# Patient Record
Sex: FEMALE | Race: WHITE | NOT HISPANIC OR LATINO | Employment: UNEMPLOYED | URBAN - NONMETROPOLITAN AREA
[De-identification: names, ages, dates, MRNs, and addresses within clinical notes are randomized per-mention and may not be internally consistent; named-entity substitution may affect disease eponyms.]

---

## 2023-03-09 DIAGNOSIS — R60.9 EDEMA, UNSPECIFIED: ICD-10-CM

## 2023-03-09 RX ORDER — FUROSEMIDE 40 MG/1
TABLET ORAL
Qty: 90 TABLET | Refills: 1 | Status: SHIPPED | OUTPATIENT
Start: 2023-03-09 | End: 2024-04-02 | Stop reason: WASHOUT

## 2023-03-23 DIAGNOSIS — F32.A DEPRESSION, UNSPECIFIED: ICD-10-CM

## 2023-03-23 RX ORDER — DULOXETIN HYDROCHLORIDE 60 MG/1
CAPSULE, DELAYED RELEASE ORAL
Qty: 90 CAPSULE | Refills: 1 | Status: SHIPPED | OUTPATIENT
Start: 2023-03-23 | End: 2024-01-25

## 2023-03-23 RX ORDER — DULOXETIN HYDROCHLORIDE 30 MG/1
CAPSULE, DELAYED RELEASE ORAL
Qty: 90 CAPSULE | Refills: 1 | Status: SHIPPED | OUTPATIENT
Start: 2023-03-23 | End: 2023-07-13

## 2023-03-30 DIAGNOSIS — G25.81 RESTLESS LEGS SYNDROME: ICD-10-CM

## 2023-03-30 PROBLEM — E11.8 DIABETES MELLITUS WITH COMPLICATION (MULTI): Status: ACTIVE | Noted: 2023-03-30

## 2023-03-30 PROBLEM — E78.5 HYPERLIPIDEMIA: Status: ACTIVE | Noted: 2023-03-30

## 2023-03-30 PROBLEM — G47.33 OBSTRUCTIVE SLEEP APNEA: Status: ACTIVE | Noted: 2023-03-30

## 2023-03-30 PROBLEM — R76.8 POSITIVE ANA (ANTINUCLEAR ANTIBODY): Status: RESOLVED | Noted: 2023-03-30 | Resolved: 2023-03-30

## 2023-03-30 PROBLEM — G43.909 MIGRAINE HEADACHE: Status: ACTIVE | Noted: 2023-03-30

## 2023-03-30 PROBLEM — I25.10 CAD (CORONARY ARTERY DISEASE): Status: RESOLVED | Noted: 2023-03-30 | Resolved: 2023-03-30

## 2023-03-30 PROBLEM — M75.101 ROTATOR CUFF SYNDROME OF RIGHT SHOULDER: Status: RESOLVED | Noted: 2023-03-30 | Resolved: 2023-03-30

## 2023-03-30 PROBLEM — F51.04 CHRONIC INSOMNIA: Status: ACTIVE | Noted: 2023-03-30

## 2023-03-30 PROBLEM — F32.A ANXIETY AND DEPRESSION: Status: ACTIVE | Noted: 2023-03-30

## 2023-03-30 PROBLEM — F32.2 SEVERE MAJOR DEPRESSION (MULTI): Status: ACTIVE | Noted: 2023-03-30

## 2023-03-30 PROBLEM — E03.9 HYPOTHYROIDISM: Status: ACTIVE | Noted: 2023-03-30

## 2023-03-30 PROBLEM — F41.9 ANXIETY AND DEPRESSION: Status: ACTIVE | Noted: 2023-03-30

## 2023-03-30 RX ORDER — GEMFIBROZIL 600 MG/1
1 TABLET, FILM COATED ORAL 2 TIMES DAILY
COMMUNITY
Start: 2012-12-07 | End: 2023-12-21

## 2023-03-30 RX ORDER — PROPRANOLOL HYDROCHLORIDE 60 MG/1
1 CAPSULE, EXTENDED RELEASE ORAL DAILY
COMMUNITY
Start: 2021-06-28 | End: 2023-04-27 | Stop reason: DRUGHIGH

## 2023-03-30 RX ORDER — DULAGLUTIDE 0.75 MG/.5ML
INJECTION, SOLUTION SUBCUTANEOUS
COMMUNITY
End: 2023-07-13

## 2023-03-30 RX ORDER — TRAZODONE HYDROCHLORIDE 50 MG/1
TABLET ORAL NIGHTLY PRN
COMMUNITY
End: 2024-05-17

## 2023-03-30 RX ORDER — METFORMIN HYDROCHLORIDE 1000 MG/1
1000 TABLET ORAL
COMMUNITY
End: 2023-05-24

## 2023-03-30 RX ORDER — SUMATRIPTAN 50 MG/1
TABLET, FILM COATED ORAL
COMMUNITY
Start: 2021-05-20

## 2023-03-30 RX ORDER — INSULIN GLARGINE 100 [IU]/ML
INJECTION, SOLUTION SUBCUTANEOUS
COMMUNITY
End: 2023-04-27 | Stop reason: SDUPTHER

## 2023-03-30 RX ORDER — PEN NEEDLE, DIABETIC 32GX 5/32"
NEEDLE, DISPOSABLE MISCELLANEOUS
COMMUNITY
Start: 2022-11-30 | End: 2023-11-28

## 2023-03-30 RX ORDER — BLOOD-GLUCOSE METER
KIT MISCELLANEOUS 4 TIMES DAILY
COMMUNITY
Start: 2022-05-26

## 2023-03-30 RX ORDER — GLIMEPIRIDE 4 MG/1
1 TABLET ORAL
COMMUNITY
Start: 2017-04-18

## 2023-03-30 RX ORDER — BUPROPION HYDROCHLORIDE 75 MG/1
75 TABLET ORAL NIGHTLY
COMMUNITY
End: 2024-04-02 | Stop reason: WASHOUT

## 2023-03-30 RX ORDER — LEVOTHYROXINE SODIUM 150 UG/1
150 TABLET ORAL DAILY
COMMUNITY
End: 2023-07-28 | Stop reason: SDUPTHER

## 2023-03-30 RX ORDER — CLONAZEPAM 0.5 MG/1
TABLET ORAL
COMMUNITY
Start: 2023-01-26 | End: 2023-10-31

## 2023-03-30 RX ORDER — GABAPENTIN ENACARBIL 600 MG/1
TABLET, EXTENDED RELEASE ORAL
COMMUNITY
End: 2023-03-30 | Stop reason: SDUPTHER

## 2023-03-30 RX ORDER — ATORVASTATIN CALCIUM 80 MG/1
80 TABLET, FILM COATED ORAL DAILY
COMMUNITY
End: 2024-01-14

## 2023-03-30 RX ORDER — ROPINIROLE 2 MG/1
2 TABLET, FILM COATED ORAL NIGHTLY
COMMUNITY
Start: 2023-03-03 | End: 2024-03-04

## 2023-03-31 RX ORDER — GABAPENTIN ENACARBIL 600 MG/1
TABLET, EXTENDED RELEASE ORAL
Qty: 30 TABLET | Refills: 5 | OUTPATIENT
Start: 2023-03-31

## 2023-04-27 ENCOUNTER — OFFICE VISIT (OUTPATIENT)
Dept: PRIMARY CARE | Facility: CLINIC | Age: 63
End: 2023-04-27
Payer: COMMERCIAL

## 2023-04-27 ENCOUNTER — LAB (OUTPATIENT)
Dept: LAB | Facility: LAB | Age: 63
End: 2023-04-27
Payer: COMMERCIAL

## 2023-04-27 VITALS
DIASTOLIC BLOOD PRESSURE: 82 MMHG | OXYGEN SATURATION: 96 % | SYSTOLIC BLOOD PRESSURE: 125 MMHG | HEART RATE: 101 BPM | WEIGHT: 208 LBS | TEMPERATURE: 97.5 F | HEIGHT: 66 IN | BODY MASS INDEX: 33.43 KG/M2

## 2023-04-27 DIAGNOSIS — E11.8 DIABETES MELLITUS WITH COMPLICATION (MULTI): ICD-10-CM

## 2023-04-27 DIAGNOSIS — F32.2 SEVERE MAJOR DEPRESSION (MULTI): ICD-10-CM

## 2023-04-27 DIAGNOSIS — Z12.11 SCREENING FOR COLON CANCER: ICD-10-CM

## 2023-04-27 DIAGNOSIS — R39.89 ABNORMAL URINE COLOR: ICD-10-CM

## 2023-04-27 DIAGNOSIS — R42 DIZZINESS: ICD-10-CM

## 2023-04-27 DIAGNOSIS — R00.2 PALPITATIONS: ICD-10-CM

## 2023-04-27 DIAGNOSIS — Z12.31 ENCOUNTER FOR SCREENING MAMMOGRAM FOR BREAST CANCER: ICD-10-CM

## 2023-04-27 DIAGNOSIS — E11.8 DIABETES MELLITUS WITH COMPLICATION (MULTI): Primary | ICD-10-CM

## 2023-04-27 LAB
ALANINE AMINOTRANSFERASE (SGPT) (U/L) IN SER/PLAS: 28 U/L (ref 7–45)
ALBUMIN (G/DL) IN SER/PLAS: 4.7 G/DL (ref 3.4–5)
ALKALINE PHOSPHATASE (U/L) IN SER/PLAS: 113 U/L (ref 33–136)
ANION GAP IN SER/PLAS: 14 MMOL/L (ref 10–20)
APPEARANCE, URINE: ABNORMAL
ASPARTATE AMINOTRANSFERASE (SGOT) (U/L) IN SER/PLAS: 23 U/L (ref 9–39)
BILIRUBIN TOTAL (MG/DL) IN SER/PLAS: 0.5 MG/DL (ref 0–1.2)
BILIRUBIN, URINE: NEGATIVE
BLOOD, URINE: NEGATIVE
CALCIUM (MG/DL) IN SER/PLAS: 10.2 MG/DL (ref 8.6–10.3)
CARBON DIOXIDE, TOTAL (MMOL/L) IN SER/PLAS: 31 MMOL/L (ref 21–32)
CHLORIDE (MMOL/L) IN SER/PLAS: 99 MMOL/L (ref 98–107)
CHOLESTEROL (MG/DL) IN SER/PLAS: 230 MG/DL (ref 0–199)
CHOLESTEROL IN HDL (MG/DL) IN SER/PLAS: 39.8 MG/DL
CHOLESTEROL/HDL RATIO: 5.8
COLOR, URINE: YELLOW
CREATININE (MG/DL) IN SER/PLAS: 1.08 MG/DL (ref 0.5–1.05)
ERYTHROCYTE DISTRIBUTION WIDTH (RATIO) BY AUTOMATED COUNT: 13 % (ref 11.5–14.5)
ERYTHROCYTE MEAN CORPUSCULAR HEMOGLOBIN CONCENTRATION (G/DL) BY AUTOMATED: 31.9 G/DL (ref 32–36)
ERYTHROCYTE MEAN CORPUSCULAR VOLUME (FL) BY AUTOMATED COUNT: 89 FL (ref 80–100)
ERYTHROCYTES (10*6/UL) IN BLOOD BY AUTOMATED COUNT: 5.5 X10E12/L (ref 4–5.2)
GFR FEMALE: 58 ML/MIN/1.73M2
GLUCOSE (MG/DL) IN SER/PLAS: 144 MG/DL (ref 74–99)
GLUCOSE, URINE: NEGATIVE MG/DL
HEMATOCRIT (%) IN BLOOD BY AUTOMATED COUNT: 49.2 % (ref 36–46)
HEMOGLOBIN (G/DL) IN BLOOD: 15.7 G/DL (ref 12–16)
HYALINE CASTS, URINE: ABNORMAL /LPF
KETONES, URINE: NEGATIVE MG/DL
LDL: 150 MG/DL (ref 0–99)
LEUKOCYTE ESTERASE, URINE: ABNORMAL
LEUKOCYTES (10*3/UL) IN BLOOD BY AUTOMATED COUNT: 7.5 X10E9/L (ref 4.4–11.3)
MUCUS, URINE: ABNORMAL /LPF
NITRITE, URINE: NEGATIVE
NON HDL CHOLESTEROL: 190 MG/DL
PH, URINE: 5 (ref 5–8)
PLATELETS (10*3/UL) IN BLOOD AUTOMATED COUNT: 445 X10E9/L (ref 150–450)
POC HEMOGLOBIN A1C: 8.7 % (ref 4.2–6.5)
POTASSIUM (MMOL/L) IN SER/PLAS: 4.2 MMOL/L (ref 3.5–5.3)
PROTEIN TOTAL: 8.2 G/DL (ref 6.4–8.2)
PROTEIN, URINE: ABNORMAL MG/DL
RBC, URINE: ABNORMAL /HPF (ref 0–5)
SODIUM (MMOL/L) IN SER/PLAS: 140 MMOL/L (ref 136–145)
SPECIFIC GRAVITY, URINE: 1.02 (ref 1–1.03)
SQUAMOUS EPITHELIAL CELLS, URINE: 3 /HPF
THYROTROPIN (MIU/L) IN SER/PLAS BY DETECTION LIMIT <= 0.05 MIU/L: 0.91 MIU/L (ref 0.44–3.98)
TRIGLYCERIDE (MG/DL) IN SER/PLAS: 200 MG/DL (ref 0–149)
UREA NITROGEN (MG/DL) IN SER/PLAS: 19 MG/DL (ref 6–23)
UROBILINOGEN, URINE: <2 MG/DL (ref 0–1.9)
VLDL: 40 MG/DL (ref 0–40)
WBC, URINE: 3 /HPF (ref 0–5)

## 2023-04-27 PROCEDURE — 83036 HEMOGLOBIN GLYCOSYLATED A1C: CPT | Performed by: FAMILY MEDICINE

## 2023-04-27 PROCEDURE — 80053 COMPREHEN METABOLIC PANEL: CPT

## 2023-04-27 PROCEDURE — 87086 URINE CULTURE/COLONY COUNT: CPT

## 2023-04-27 PROCEDURE — 84443 ASSAY THYROID STIM HORMONE: CPT

## 2023-04-27 PROCEDURE — 3079F DIAST BP 80-89 MM HG: CPT | Performed by: FAMILY MEDICINE

## 2023-04-27 PROCEDURE — 36415 COLL VENOUS BLD VENIPUNCTURE: CPT

## 2023-04-27 PROCEDURE — 85027 COMPLETE CBC AUTOMATED: CPT

## 2023-04-27 PROCEDURE — 80061 LIPID PANEL: CPT

## 2023-04-27 PROCEDURE — 3074F SYST BP LT 130 MM HG: CPT | Performed by: FAMILY MEDICINE

## 2023-04-27 PROCEDURE — 1036F TOBACCO NON-USER: CPT | Performed by: FAMILY MEDICINE

## 2023-04-27 PROCEDURE — 99214 OFFICE O/P EST MOD 30 MIN: CPT | Performed by: FAMILY MEDICINE

## 2023-04-27 PROCEDURE — 81001 URINALYSIS AUTO W/SCOPE: CPT

## 2023-04-27 PROCEDURE — 3008F BODY MASS INDEX DOCD: CPT | Performed by: FAMILY MEDICINE

## 2023-04-27 RX ORDER — PROPRANOLOL HYDROCHLORIDE 20 MG/1
20 TABLET ORAL NIGHTLY
Qty: 30 TABLET | Refills: 5 | Status: SHIPPED | OUTPATIENT
Start: 2023-04-27 | End: 2023-05-30

## 2023-04-27 RX ORDER — INSULIN GLARGINE 100 [IU]/ML
INJECTION, SOLUTION SUBCUTANEOUS
Qty: 15 ML | Refills: 2 | Status: SHIPPED | OUTPATIENT
Start: 2023-04-27 | End: 2023-10-31

## 2023-04-27 ASSESSMENT — PATIENT HEALTH QUESTIONNAIRE - PHQ9
SUM OF ALL RESPONSES TO PHQ9 QUESTIONS 1 AND 2: 0
2. FEELING DOWN, DEPRESSED OR HOPELESS: NOT AT ALL
1. LITTLE INTEREST OR PLEASURE IN DOING THINGS: NOT AT ALL

## 2023-04-27 ASSESSMENT — ENCOUNTER SYMPTOMS
LOSS OF SENSATION IN FEET: 0
OCCASIONAL FEELINGS OF UNSTEADINESS: 0
DEPRESSION: 0

## 2023-04-27 NOTE — PROGRESS NOTES
"Subjective   Patient ID: Mindy Dominguez is a 62 y.o. female who presents for Follow-up (Follow up on A1C/Has been dizzy getting up and walking around/Feeling fatigue).  HPI  Here for follow up  Has been getting dizzy when standing up, BP sitting - 125/82, standing BP - 94/66. Is on propranolol 60mg, will reduce dosage. Is on it for palpitations.   As well patient admits she skips meals regularly, some days only eats once a day  Has diabetes, today's hba1c 8.7, improved from before. Compliant with meds  Has noticed abnormal urine odor  Has depression, compliant with meds, following with counsellor      Review of Systems  General: no fever  Eyes: no blurry vision  ENT: no sore throat, no ear pain  Resp: no cough, sob or wheezing  Cardio: no chest pain, no palpitations  Abd: no nausea/vomiting  : no dysuria, no increased urinary frequency      /82   Pulse 101   Temp 36.4 °C (97.5 °F)   Ht 1.664 m (5' 5.5\")   Wt 94.3 kg (208 lb)   SpO2 96%   BMI 34.09 kg/m²       Objective   Physical Exam    Gen: NAD, alert  Head: normocephalic/atraumatic  Eyes: conjunctivae normal  Ears: canals clear bilaterally, TM normal   Nose: Deferred due to covid precautions, wearing mask  Oropharynx: Deferred due to covid precautions, wearing mask  Resp: Clear to auscultation  CVS: Regular rate and rhythm  Abdomen: soft, NT, ND  Ext: no edema, NT of lower extremities  Neuro: gait normal       Assessment/Plan   Problem List Items Addressed This Visit       Diabetes mellitus with complication (CMS/HCC) - Primary    Relevant Medications    insulin glargine (Lantus) 100 unit/mL (3 mL) pen    Other Relevant Orders    POCT glycosylated hemoglobin (Hb A1C) manually resulted (Completed)    Lipid Panel (Completed)    TSH with reflex to Free T4 if abnormal (Completed)    Comprehensive Metabolic Panel (Completed)    CBC (Completed)    Severe major depression (CMS/HCC)     Continue current regimen, continue follow up with psych      "     Other Visit Diagnoses       Abnormal urine color        Relevant Orders    Urinalysis with Reflex Microscopic (Completed)    Urine Culture (Completed)    BMI 34.0-34.9,adult        Encounter for screening mammogram for breast cancer        Relevant Orders    BI mammo bilateral screening tomosynthesis    Palpitations        Relevant Medications    propranolol (Inderal) 20 mg tablet    Screening for colon cancer        Relevant Orders    Colonoscopy          Dizziness - propranolol dosage reduced, check CMP, CBC, TSH

## 2023-04-27 NOTE — PATIENT INSTRUCTIONS
Please follow up in 3 months  Please take your medications as prescribed  Please get your blood work, colonoscopy and mammogram

## 2023-04-28 LAB — URINE CULTURE: NORMAL

## 2023-05-01 ENCOUNTER — TELEPHONE (OUTPATIENT)
Dept: PRIMARY CARE | Facility: CLINIC | Age: 63
End: 2023-05-01
Payer: COMMERCIAL

## 2023-05-01 NOTE — TELEPHONE ENCOUNTER
Tamir Pulliam, can you please let Mindy know that her blood work showed that her cholesterol levels were elevated, to continue taking her cholesterol medication and watching her diet, and exercising.   Her kidney function was slightly low, to keep hydrated, could be why she was feeling a little dizzy at the time of her visit, to continue to keep hydrated, will continue to monitor  Her sugar was elevated but is a diabetic, to continue her medications.   Her urine culture was negative for UTI  Thyroid level, potassium, liver enzymes normal   Thanks  Dr. Corcoran     Pt notified

## 2023-05-24 DIAGNOSIS — E11.8 TYPE 2 DIABETES MELLITUS WITH UNSPECIFIED COMPLICATIONS (MULTI): ICD-10-CM

## 2023-05-24 RX ORDER — METFORMIN HYDROCHLORIDE 1000 MG/1
TABLET ORAL
Qty: 180 TABLET | Refills: 1 | Status: SHIPPED | OUTPATIENT
Start: 2023-05-24

## 2023-05-30 DIAGNOSIS — R00.2 PALPITATIONS: ICD-10-CM

## 2023-05-30 RX ORDER — PROPRANOLOL HYDROCHLORIDE 20 MG/1
20 TABLET ORAL NIGHTLY
Qty: 30 TABLET | Refills: 5 | Status: SHIPPED | OUTPATIENT
Start: 2023-05-30 | End: 2024-04-02 | Stop reason: WASHOUT

## 2023-07-12 DIAGNOSIS — F32.A DEPRESSION, UNSPECIFIED: ICD-10-CM

## 2023-07-12 DIAGNOSIS — E11.8 TYPE 2 DIABETES MELLITUS WITH UNSPECIFIED COMPLICATIONS (MULTI): ICD-10-CM

## 2023-07-13 RX ORDER — DULOXETIN HYDROCHLORIDE 30 MG/1
CAPSULE, DELAYED RELEASE ORAL
Qty: 90 CAPSULE | Refills: 1 | Status: SHIPPED | OUTPATIENT
Start: 2023-07-13 | End: 2023-07-27 | Stop reason: ALTCHOICE

## 2023-07-13 RX ORDER — DULAGLUTIDE 0.75 MG/.5ML
INJECTION, SOLUTION SUBCUTANEOUS
Qty: 4 EACH | Refills: 5 | Status: SHIPPED | OUTPATIENT
Start: 2023-07-13 | End: 2024-04-02 | Stop reason: WASHOUT

## 2023-07-27 ENCOUNTER — PROCEDURE VISIT (OUTPATIENT)
Dept: PRIMARY CARE | Facility: CLINIC | Age: 63
End: 2023-07-27
Payer: COMMERCIAL

## 2023-07-27 ENCOUNTER — LAB (OUTPATIENT)
Dept: LAB | Facility: LAB | Age: 63
End: 2023-07-27
Payer: COMMERCIAL

## 2023-07-27 VITALS
WEIGHT: 203.4 LBS | BODY MASS INDEX: 32.69 KG/M2 | HEIGHT: 66 IN | TEMPERATURE: 98.2 F | OXYGEN SATURATION: 94 % | HEART RATE: 109 BPM | SYSTOLIC BLOOD PRESSURE: 120 MMHG | DIASTOLIC BLOOD PRESSURE: 78 MMHG

## 2023-07-27 DIAGNOSIS — R61 DIAPHORESIS: ICD-10-CM

## 2023-07-27 DIAGNOSIS — E03.9 HYPOTHYROIDISM, UNSPECIFIED TYPE: ICD-10-CM

## 2023-07-27 DIAGNOSIS — E11.8 DIABETES MELLITUS WITH COMPLICATION (MULTI): Primary | ICD-10-CM

## 2023-07-27 LAB
ALANINE AMINOTRANSFERASE (SGPT) (U/L) IN SER/PLAS: 22 U/L (ref 7–45)
ALBUMIN (G/DL) IN SER/PLAS: 4.4 G/DL (ref 3.4–5)
ALKALINE PHOSPHATASE (U/L) IN SER/PLAS: 94 U/L (ref 33–136)
ANION GAP IN SER/PLAS: 16 MMOL/L (ref 10–20)
ASPARTATE AMINOTRANSFERASE (SGOT) (U/L) IN SER/PLAS: 19 U/L (ref 9–39)
BILIRUBIN TOTAL (MG/DL) IN SER/PLAS: 0.4 MG/DL (ref 0–1.2)
CALCIUM (MG/DL) IN SER/PLAS: 10.5 MG/DL (ref 8.6–10.3)
CARBON DIOXIDE, TOTAL (MMOL/L) IN SER/PLAS: 26 MMOL/L (ref 21–32)
CHLORIDE (MMOL/L) IN SER/PLAS: 104 MMOL/L (ref 98–107)
CREATININE (MG/DL) IN SER/PLAS: 1.02 MG/DL (ref 0.5–1.05)
ERYTHROCYTE DISTRIBUTION WIDTH (RATIO) BY AUTOMATED COUNT: 13.1 % (ref 11.5–14.5)
ERYTHROCYTE MEAN CORPUSCULAR HEMOGLOBIN CONCENTRATION (G/DL) BY AUTOMATED: 31.9 G/DL (ref 32–36)
ERYTHROCYTE MEAN CORPUSCULAR VOLUME (FL) BY AUTOMATED COUNT: 89 FL (ref 80–100)
ERYTHROCYTES (10*6/UL) IN BLOOD BY AUTOMATED COUNT: 5.31 X10E12/L (ref 4–5.2)
GFR FEMALE: 62 ML/MIN/1.73M2
GLUCOSE (MG/DL) IN SER/PLAS: 286 MG/DL (ref 74–99)
HEMATOCRIT (%) IN BLOOD BY AUTOMATED COUNT: 47.4 % (ref 36–46)
HEMOGLOBIN (G/DL) IN BLOOD: 15.1 G/DL (ref 12–16)
LEUKOCYTES (10*3/UL) IN BLOOD BY AUTOMATED COUNT: 5.2 X10E9/L (ref 4.4–11.3)
PLATELETS (10*3/UL) IN BLOOD AUTOMATED COUNT: 358 X10E9/L (ref 150–450)
POC HEMOGLOBIN A1C: 8.3 % (ref 4.2–6.5)
POTASSIUM (MMOL/L) IN SER/PLAS: 4.4 MMOL/L (ref 3.5–5.3)
PROTEIN TOTAL: 7.7 G/DL (ref 6.4–8.2)
SODIUM (MMOL/L) IN SER/PLAS: 142 MMOL/L (ref 136–145)
THYROTROPIN (MIU/L) IN SER/PLAS BY DETECTION LIMIT <= 0.05 MIU/L: 0.18 MIU/L (ref 0.44–3.98)
THYROXINE (T4) FREE (NG/DL) IN SER/PLAS: 0.95 NG/DL (ref 0.61–1.12)
UREA NITROGEN (MG/DL) IN SER/PLAS: 18 MG/DL (ref 6–23)

## 2023-07-27 PROCEDURE — 99213 OFFICE O/P EST LOW 20 MIN: CPT | Performed by: FAMILY MEDICINE

## 2023-07-27 PROCEDURE — 84439 ASSAY OF FREE THYROXINE: CPT

## 2023-07-27 PROCEDURE — 84443 ASSAY THYROID STIM HORMONE: CPT

## 2023-07-27 PROCEDURE — 85027 COMPLETE CBC AUTOMATED: CPT

## 2023-07-27 PROCEDURE — 83001 ASSAY OF GONADOTROPIN (FSH): CPT

## 2023-07-27 PROCEDURE — 36415 COLL VENOUS BLD VENIPUNCTURE: CPT

## 2023-07-27 PROCEDURE — 80053 COMPREHEN METABOLIC PANEL: CPT

## 2023-07-27 PROCEDURE — 83036 HEMOGLOBIN GLYCOSYLATED A1C: CPT | Performed by: FAMILY MEDICINE

## 2023-07-27 PROCEDURE — 86481 TB AG RESPONSE T-CELL SUSP: CPT

## 2023-07-27 PROCEDURE — 83002 ASSAY OF GONADOTROPIN (LH): CPT

## 2023-07-27 ASSESSMENT — ENCOUNTER SYMPTOMS
OCCASIONAL FEELINGS OF UNSTEADINESS: 0
LOSS OF SENSATION IN FEET: 0
DEPRESSION: 0

## 2023-07-27 NOTE — PATIENT INSTRUCTIONS
Please follow up in 6 months  Please take your medications as prescribed  Please get your blood work and colonoscopy done

## 2023-07-27 NOTE — PROGRESS NOTES
"Subjective   Patient ID: Mindy Dominguez is a 63 y.o. female who presents for Follow-up (Sweating all the time, soaked at night getting worse ).  HPI  Here for follow up  Has been having diaphoresis, happens at time, day or night, but night sweats are worse. Gets soaked. Does have hypothyroidism, compliant with levothyroxine.   Has diabetes, today's hba1c 8.3, improved from before. Compliant with meds, been watching her diet more and been more active, been losing weight  Will stop the 30mg cymbalta, try to wean down off some meds, getting too drowsy at times    Review of Systems  General: no fever  Eyes: no blurry vision  ENT: no sore throat, no ear pain  Resp: no cough, sob or wheezing  Cardio: no chest pain, no palpitations  Abd: no nausea/vomiting  : no dysuria, no increased urinary frequency      /78   Pulse 109   Temp 36.8 °C (98.2 °F) (Oral)   Ht 1.664 m (5' 5.5\")   Wt 92.3 kg (203 lb 6.4 oz)   SpO2 94%   BMI 33.33 kg/m²       Objective   Physical Exam  Gen: NAD, alert  Head: normocephalic/atraumatic  Eyes: conjunctivae normal  Ears: canals clear bilaterally, TM normal   Nose: external nose normal   Resp: Clear to auscultation  CVS: Regular rate and rhythm  Abdomen: soft, NT, ND  Ext: no edema, NT of lower extremities  Neuro: gait normal       Assessment/Plan   Problem List Items Addressed This Visit       Diabetes mellitus with complication (CMS/HCC) - Primary    Relevant Orders    POCT glycosylated hemoglobin (Hb A1C) manually resulted (Completed)    Hypothyroidism    Relevant Orders    TSH with reflex to Free T4 if abnormal (Completed)     Other Visit Diagnoses       Diaphoresis        Relevant Orders    CBC (Completed)    T-Spot TB (Completed)    Comprehensive Metabolic Panel (Completed)    FSH (Completed)    Luteinizing hormone (Completed)    BMI 33.0-33.9,adult                   "

## 2023-07-28 DIAGNOSIS — E03.9 HYPOTHYROIDISM, UNSPECIFIED TYPE: Primary | ICD-10-CM

## 2023-07-28 LAB
FOLLITROPIN (IU/L) IN SER/PLAS: 46.2 IU/L
LUTEINIZING HORMONE (IU/ML) IN SER/PLAS: 25.1 IU/L

## 2023-07-28 RX ORDER — LEVOTHYROXINE SODIUM 150 UG/1
150 TABLET ORAL DAILY
Qty: 90 TABLET | Refills: 1 | Status: SHIPPED | OUTPATIENT
Start: 2023-07-28 | End: 2024-04-01

## 2023-07-30 LAB
NIL(NEG) CONTROL SPOT COUNT: NORMAL
PANEL A SPOT COUNT: 0
PANEL B SPOT COUNT: 1
POS CONTROL SPOT COUNT: NORMAL
T-SPOT. TB INTERPRETATION: NEGATIVE

## 2023-09-14 ENCOUNTER — TELEPHONE (OUTPATIENT)
Dept: PRIMARY CARE | Facility: CLINIC | Age: 63
End: 2023-09-14
Payer: COMMERCIAL

## 2023-09-14 DIAGNOSIS — E11.8 DIABETES MELLITUS WITH COMPLICATION (MULTI): Primary | ICD-10-CM

## 2023-09-14 NOTE — TELEPHONE ENCOUNTER
Would you put a referral in the system for Dr. Castro, podiatry so Mindy can discuss her foot neuropathy?

## 2023-09-19 LAB
ALANINE AMINOTRANSFERASE (SGPT) (U/L) IN SER/PLAS: 23 U/L (ref 7–45)
ALBUMIN (G/DL) IN SER/PLAS: 4.4 G/DL (ref 3.4–5)
ALKALINE PHOSPHATASE (U/L) IN SER/PLAS: 104 U/L (ref 33–136)
ANION GAP IN SER/PLAS: 14 MMOL/L (ref 10–20)
ASPARTATE AMINOTRANSFERASE (SGOT) (U/L) IN SER/PLAS: 20 U/L (ref 9–39)
BASOPHILS (10*3/UL) IN BLOOD BY AUTOMATED COUNT: 0.06 X10E9/L (ref 0–0.1)
BASOPHILS/100 LEUKOCYTES IN BLOOD BY AUTOMATED COUNT: 1 % (ref 0–2)
BILIRUBIN TOTAL (MG/DL) IN SER/PLAS: 0.5 MG/DL (ref 0–1.2)
CALCIUM (MG/DL) IN SER/PLAS: 9.6 MG/DL (ref 8.6–10.3)
CARBON DIOXIDE, TOTAL (MMOL/L) IN SER/PLAS: 26 MMOL/L (ref 21–32)
CHLORIDE (MMOL/L) IN SER/PLAS: 101 MMOL/L (ref 98–107)
CREATININE (MG/DL) IN SER/PLAS: 0.78 MG/DL (ref 0.5–1.05)
EOSINOPHILS (10*3/UL) IN BLOOD BY AUTOMATED COUNT: 0.13 X10E9/L (ref 0–0.7)
EOSINOPHILS/100 LEUKOCYTES IN BLOOD BY AUTOMATED COUNT: 2.2 % (ref 0–6)
ERYTHROCYTE DISTRIBUTION WIDTH (RATIO) BY AUTOMATED COUNT: 13.3 % (ref 11.5–14.5)
ERYTHROCYTE MEAN CORPUSCULAR HEMOGLOBIN CONCENTRATION (G/DL) BY AUTOMATED: 31.7 G/DL (ref 32–36)
ERYTHROCYTE MEAN CORPUSCULAR VOLUME (FL) BY AUTOMATED COUNT: 90 FL (ref 80–100)
ERYTHROCYTES (10*6/UL) IN BLOOD BY AUTOMATED COUNT: 4.92 X10E12/L (ref 4–5.2)
GFR FEMALE: 85 ML/MIN/1.73M2
GLUCOSE (MG/DL) IN SER/PLAS: 233 MG/DL (ref 74–99)
HEMATOCRIT (%) IN BLOOD BY AUTOMATED COUNT: 44.5 % (ref 36–46)
HEMOGLOBIN (G/DL) IN BLOOD: 14.1 G/DL (ref 12–16)
IMMATURE GRANULOCYTES/100 LEUKOCYTES IN BLOOD BY AUTOMATED COUNT: 0.3 % (ref 0–0.9)
LEUKOCYTES (10*3/UL) IN BLOOD BY AUTOMATED COUNT: 6 X10E9/L (ref 4.4–11.3)
LYMPHOCYTES (10*3/UL) IN BLOOD BY AUTOMATED COUNT: 1.6 X10E9/L (ref 1.2–4.8)
LYMPHOCYTES/100 LEUKOCYTES IN BLOOD BY AUTOMATED COUNT: 26.8 % (ref 13–44)
MONOCYTES (10*3/UL) IN BLOOD BY AUTOMATED COUNT: 0.36 X10E9/L (ref 0.1–1)
MONOCYTES/100 LEUKOCYTES IN BLOOD BY AUTOMATED COUNT: 6 % (ref 2–10)
NEUTROPHILS (10*3/UL) IN BLOOD BY AUTOMATED COUNT: 3.81 X10E9/L (ref 1.2–7.7)
NEUTROPHILS/100 LEUKOCYTES IN BLOOD BY AUTOMATED COUNT: 63.7 % (ref 40–80)
PLATELETS (10*3/UL) IN BLOOD AUTOMATED COUNT: 325 X10E9/L (ref 150–450)
POTASSIUM (MMOL/L) IN SER/PLAS: 4.3 MMOL/L (ref 3.5–5.3)
PROTEIN TOTAL: 7.9 G/DL (ref 6.4–8.2)
PROTEIN TOTAL: 7.9 G/DL (ref 6.4–8.2)
SEDIMENTATION RATE, ERYTHROCYTE: NORMAL
SODIUM (MMOL/L) IN SER/PLAS: 137 MMOL/L (ref 136–145)
THYROTROPIN (MIU/L) IN SER/PLAS BY DETECTION LIMIT <= 0.05 MIU/L: 2.17 MIU/L (ref 0.44–3.98)
UREA NITROGEN (MG/DL) IN SER/PLAS: 11 MG/DL (ref 6–23)

## 2023-09-20 LAB
CALCIDIOL (25 OH VITAMIN D3) (NG/ML) IN SER/PLAS: 19 NG/ML
COBALAMIN (VITAMIN B12) (PG/ML) IN SER/PLAS: 271 PG/ML (ref 211–911)
ESTIMATED AVERAGE GLUCOSE FOR HBA1C: 206 MG/DL
FOLATE (NG/ML) IN SER/PLAS: >24 NG/ML
HEMOGLOBIN A1C/HEMOGLOBIN TOTAL IN BLOOD: 8.8 %
RHEUMATOID FACTOR (IU/ML) IN SERUM OR PLASMA: <10 IU/ML (ref 0–15)
SYPHILIS TOTAL AB: NONREACTIVE

## 2023-09-21 LAB
ANTI-NUCLEAR ANTIBODY (ANA): NEGATIVE
ARSENIC: <10 UG/L
LEAD, BLOOD (ARUP): <2 UG/DL
MERCURY BLOOD: <2.5 UG/L

## 2023-09-25 LAB
ALBUMIN ELP: 4.4 G/DL (ref 3.4–5)
ALPHA 1: 0.3 G/DL (ref 0.2–0.6)
ALPHA 2: 0.9 G/DL (ref 0.4–1.1)
BETA: 1.2 G/DL (ref 0.5–1.2)
GAMMA GLOBULIN: 1.1 G/DL (ref 0.5–1.4)
LEAD (UG/DL) IN BLOOD: <1 MCG/DL
PATH REVIEW - SERUM IMMUNOFIXATION: NORMAL
PATH REVIEW-SERUM PROTEIN ELECTROPHORESIS: NORMAL
PROTEIN ELECTROPHORESIS INTERPRETATION: NORMAL
PROTEIN TOTAL: 7.9 G/DL (ref 6.4–8.2)
SERUM IMMUNOFIXATION INTERPRETATION: NORMAL

## 2023-11-02 DIAGNOSIS — E03.9 HYPOTHYROIDISM, UNSPECIFIED TYPE: Primary | ICD-10-CM

## 2023-11-14 DIAGNOSIS — E11.8 DIABETES MELLITUS WITH COMPLICATION (MULTI): Primary | ICD-10-CM

## 2023-11-21 ENCOUNTER — TELEMEDICINE (OUTPATIENT)
Dept: PHARMACY | Facility: HOSPITAL | Age: 63
End: 2023-11-21
Payer: COMMERCIAL

## 2023-11-21 DIAGNOSIS — E11.8 DIABETES MELLITUS WITH COMPLICATION (MULTI): ICD-10-CM

## 2023-11-21 RX ORDER — BLOOD-GLUCOSE SENSOR
EACH MISCELLANEOUS
Qty: 2 EACH | Refills: 2 | Status: SHIPPED | OUTPATIENT
Start: 2023-11-21 | End: 2024-02-19

## 2023-11-21 NOTE — PROGRESS NOTES
"Patient is sent at the request of Marlin Green* for my opinion regarding Type 2 diabetes.  My final recommendations will be communicated back to the requesting provider by way of shared medical record.    Subjective     Diabetes  She has type 2 diabetes mellitus. Current diabetic treatment includes insulin injections and oral agent (dual therapy).     Current diet:  ate a whole foot-long sub yesterday, trying to eat smaller portions, diet is \"terrible\", coordinates meals with her brother - he makes stuffed peppers or stew with potatoes and carrots, eats large salad with chick peas, kidney beans, olives and small amount of dressing. One meal a day. May snack after dinner - toast.    Current exercise:  very low activity; washer and dryer downstairs - walks stairs often. Asparagus field in the summer.     The patient does have a known family history of diabetes.    Allergies   Allergen Reactions    Amoxicillin-Pot Clavulanate Unknown    Lisinopril Unknown    Tetracycline Unknown     tongue swells    Sulfa (Sulfonamide Antibiotics) Hives and Rash       Patient is using: glucometer; does not check regularly. Checked 3 times since the end of July. 129 mg/dL in one instance and 280 mg/dL on another occasion. Fingers get sore, only tested on right hand.     Hypoglycemia frequency: Rare  Hypoglycemia awareness: Yes  Adverse Effects: None      Objective     There were no vitals taken for this visit.    Diabetes Pharmacotherapy:  Trulicity 0.75 mg once weekly (not used since July - painful injection)     Oral agents (dual therapy): glimepiride (Amaryl), metformin (generic)   Insulin injections: Insulin dosage review with Mindy suggested noncompliance some of the time.   Pre-breakfast N/A   Pre-lunch N/A   Pre-dinner N/A   Bedtime Lantus 42 units   Insulin injections are given by patient.   Rotation of sites for injection: abdominal wall and sides . Soreness and bruising after injection due to not rotating injection " sites.     SECONDARY PREVENTION  - Statin? Yes  - ACE-I/ARB? No  - Aspirin? No    Pertinent PMH Review:  - PMH of Pancreatitis: No  - PMH of Retinopathy: No  - PMH of Urinary Tract Infections: Yes  - PMH of MTC: No  - PMH of CKD: No   - PMH of Obesity: Yes  - PMH of ASCVD: Yes    Lab Review  Lab Results   Component Value Date    BILITOT 0.5 09/19/2023    CALCIUM 9.6 09/19/2023    CO2 26 09/19/2023     09/19/2023    CREATININE 0.78 09/19/2023    GLUCOSE 233 (H) 09/19/2023    ALKPHOS 104 09/19/2023    K 4.3 09/19/2023    PROT 7.9 09/19/2023    PROT 7.9 09/19/2023    PROT 7.9 09/19/2023     09/19/2023    AST 20 09/19/2023    ALT 23 09/19/2023    BUN 11 09/19/2023    ANIONGAP 14 09/19/2023    MG 1.84 04/29/2023    ALBUMIN 4.4 09/19/2023    GFRF 85 09/19/2023     Lab Results   Component Value Date    TRIG 200 (H) 04/27/2023    CHOL 230 (H) 04/27/2023    HDL 39.8 (A) 04/27/2023     Lab Results   Component Value Date    HGBA1C 8.8 (A) 09/19/2023    HGBA1C 8.3 (A) 07/27/2023    HGBA1C 8.7 (A) 04/27/2023     The 10-year ASCVD risk score (Kristin DEVINE, et al., 2019) is: 12.9%    Values used to calculate the score:      Age: 63 years      Sex: Female      Is Non- : No      Diabetic: Yes      Tobacco smoker: No      Systolic Blood Pressure: 120 mmHg      Is BP treated: Yes      HDL Cholesterol: 39.8 mg/dL      Total Cholesterol: 230 mg/dL    Health Maintenance:   Foot Exam: With [facility] provider - Dr. Leary 9/2023  Eye Exam: Not assessed    Drug Interactions:  None    Assessment/Plan   Problem List Items Addressed This Visit       Diabetes mellitus with complication (CMS/HCC)       Patients diabetes is above goal with most recent A1c of 8.8% on 9/19/23. (Goal < 7%). Discussed disease course with patient who reported that A1c was previously better controlled while taking Trulicity. She has since stopped taking trulicity and reports that she has not taken a dose since July. She stopped primarily due  to injection pain and occasionally missing doses. Advised on health benefits of trulicity including weight loss and cardiovascular protection. Patient recalls that she lost about 20 pounds when she took trulicity previously but did not attribute her weight loss to the medication. She is agreeable to restarting and is excited for potential weight loss. Reports inconsistently taking twice daily medications including metformin and glimepiride. Patient reports that she is planning to increase her adherence to twice daily dosing moving forward. Patient also reported that she has checked her blood glucose approximately 3 times in the past 3-4 months. Patient is interested in Freestyle Aida, prescription sent to patient's preferred pharmacy.   Initiate: Freestyle Aida 3 sensor (verified phone brand and compatibility with phone application)  Restart: Trulicity 0.75 mg subcutaneously once weekly  Continue: Glimepiride 4 mg twice daily and metformin 1000 mg twice daily and insulin glargine 42 units at bedtime.   Discontinue: N/A  Compliance at present is estimated to be fair. Efforts to improve compliance (if necessary) will be directed at dietary modifications: portion size control and selecting low carbohydrate meals, increased exercise, and regular blood sugar monitorin times daily with CGM.  Education Provided to Patient:   Trulicity  Provided detailed dosing and administration counseling to ensure proper technique.   Reviewed Trulicity titration schedule, starting with 0.75 mg once weekly to 1.5 mg, 3 mg, and if tolerated 4.5 mg.  Counseled patient on the benefits of GLP-1ra, such as cardiovascular risk reduction, glycemic control, and weight loss potential.  Advised patient that they may experience improved satiety after meals and portion sizes of meals may be reduced as doses of Trulicity increase.    Freestyle Aida  Provided education on placement of sensor and discussed sensor duration of 2 weeks  (14-days)  Advised patient on interaction of vitamin C and alterations to accuracy of blood glucose readings   Requested increased frequency of testing at least 5 times per day.  Follow-up:  2 weeks  to assess adherence with oral medications, side effects on trulicity, and obtain BG readings  PCP Follow-Up: 2/6/24    Continue all meds under the continuation of care with the referring provider and clinical pharmacy team.

## 2023-11-27 DIAGNOSIS — E11.8 TYPE 2 DIABETES MELLITUS WITH UNSPECIFIED COMPLICATIONS (MULTI): ICD-10-CM

## 2023-11-28 RX ORDER — PEN NEEDLE, DIABETIC 32GX 5/32"
NEEDLE, DISPOSABLE MISCELLANEOUS
Qty: 30 EACH | Refills: 13 | Status: SHIPPED | OUTPATIENT
Start: 2023-11-28

## 2023-12-05 ENCOUNTER — TELEMEDICINE (OUTPATIENT)
Dept: PHARMACY | Facility: HOSPITAL | Age: 63
End: 2023-12-05
Payer: COMMERCIAL

## 2023-12-05 DIAGNOSIS — E11.8 DIABETES MELLITUS WITH COMPLICATION (MULTI): ICD-10-CM

## 2023-12-05 NOTE — PROGRESS NOTES
Patient is sent at the request of Marlin Green* for my opinion regarding Type 2 diabetes.  My final recommendations will be communicated back to the requesting provider by way of shared medical record.    Subjective   Diabetes  She presents for her follow-up diabetic visit. She has type 2 diabetes mellitus. There are no hypoglycemic associated symptoms. There are no hypoglycemic complications. Diabetic complications include peripheral neuropathy. Current diabetic treatment includes insulin injections and oral agent (dual therapy). She is compliant with treatment some of the time.     Current diet:  Not assessed  Current exercise:  Not assessed    Allergies   Allergen Reactions    Amoxicillin-Pot Clavulanate Unknown    Lisinopril Unknown    Tetracycline Unknown     tongue swells    Sulfa (Sulfonamide Antibiotics) Hives and Rash     Current monitoring regimen:   Patient is using: continuous glucose monitor; Freestyle michael 3.   CGM - intimidated by the sensor at first but placed it yesterday. Application on phone. Patient expressed concern regarding reading through case. Will attempt to obtain reading in about an hour when sensor is active. Reviewed options for adhesive barriers. Due for sensor change on 12/19/23. No readings checked since last visit. No symptoms of low blood glucose.     Objective     There were no vitals taken for this visit.    Diabetes Pharmacotherapy:    Trulicity 0.75 mg once weekly (not used since July - painful injection)  plan to start injection every Tuesday  Oral agents (dual therapy): glimepiride (Amaryl) 4 mg once daily, metformin (generic) 4 mg once daily  Insulin injections: Insulin dosage review with Mindy suggested noncompliance some of the time.     Pre-breakfast N/A   Pre-lunch N/A   Pre-dinner N/A   Bedtime Lantus 42 units     Insulin injections are given by patient.   Rotation of sites for injection: abdominal wall and sides . Soreness and bruising after injection due  to not rotating injection sites.     Adverse Effects:   Low B12 on Metformin - to start Metanx     Secondary Prevention:   - Statin? Yes  - ACE-I/ARB? No  - Aspirin? No    Pertinent PMH Review:  - PMH of CKD: No   - PMH of Obesity: Yes  - PMH of ASCVD: Yes  - PMH of HF: No    Lab Review  Lab Results   Component Value Date    BILITOT 0.5 09/19/2023    CALCIUM 9.6 09/19/2023    CO2 26 09/19/2023     09/19/2023    CREATININE 0.78 09/19/2023    GLUCOSE 233 (H) 09/19/2023    ALKPHOS 104 09/19/2023    K 4.3 09/19/2023    PROT 7.9 09/19/2023    PROT 7.9 09/19/2023    PROT 7.9 09/19/2023     09/19/2023    AST 20 09/19/2023    ALT 23 09/19/2023    BUN 11 09/19/2023    ANIONGAP 14 09/19/2023    MG 1.84 04/29/2023    ALBUMIN 4.4 09/19/2023    GFRF 85 09/19/2023     Lab Results   Component Value Date    TRIG 200 (H) 04/27/2023    CHOL 230 (H) 04/27/2023    HDL 39.8 (A) 04/27/2023     Lab Results   Component Value Date    HGBA1C 8.8 (A) 09/19/2023    HGBA1C 8.3 (A) 07/27/2023    HGBA1C 8.7 (A) 04/27/2023     The 10-year ASCVD risk score (Kristin DEVINE, et al., 2019) is: 9.7%    Values used to calculate the score:      Age: 63 years      Sex: Female      Is Non- : No      Diabetic: Yes      Tobacco smoker: No      Systolic Blood Pressure: 120 mmHg      Is BP treated: No      HDL Cholesterol: 39.8 mg/dL      Total Cholesterol: 230 mg/dL    Health Maintenance:   Foot Exam: With [facility] provider - Dr. Leary 9/2023   Eye Exam: Not assessed    Drug Interactions:  None     Assessment/Plan   Problem List Items Addressed This Visit       Diabetes mellitus with complication (CMS/HCC)       Patients most recent A1c is above goal of <7%. Last A1c was 8.8% on 9/19/23. Patient reports that she picked up her CGM sensors and placed her first sensor today. She has downloaded the application on her phone but has not yet been able to obtain glucose readings. Patient has not checked blood glucose since previous visit.  She is still feeling apprehensive about restarting the Trulicity injections. Writer advised on the benefits in blood glucose control and weight loss. In reviewing benefits, patient is agreeable to restarting and plans to administer first injection today. Will continue injections on Tuesdays. Patient reports that she is struggling to take both doses of glimepiride and metformin due to abnormal wake/sleep schedule. Also reported potential side effects to metformin (B12 deficiency and neuropathy). Patient was advised by podiatrist to start B12 supplement (Metanx) and continue metformin. Encouraged continued adherence with medication regimen and advised patient to call pharmacy clinic with any issues or questions between visits.   Restart: Trulicity 0.75 mg subcutaneously once weekly. Patient plans to start injection today so that injections correlate with sensor changes.   Continue: Freestyle Aida 3 sensor, glimepiride 4 mg BID, Metformin 1000 mg BID, Insulin glargine 42 units QHS  Discontinue: N/A   Education Provided to Patient:  CGM - advised patient to change sensor in two weeks. Also discussed that sensor should not be submerged in water at a depth of greater than 3 feet or for a duration of >30 minutes. Also provided recommendations for adhesive barrier or covers if sensors fall off prior to 14 days.    Trulicity - advised on weight loss benefit and improved glucose control. Provided education on administration one time per week. Discussed side effects related to nausea and increased symptoms of fullness. Also provided education on titration schedule and ability to increase dose once tolerability is established.  Follow-up: 2 weeks to assess adherence with oral medications, side effects on trulicity, and obtain BG readings  PCP Follow-Up: 2/6/24    Chichi Moreno, PharmD    Continue all meds under the continuation of care with the referring provider and clinical pharmacy team.

## 2023-12-21 DIAGNOSIS — E78.5 HYPERLIPIDEMIA, UNSPECIFIED HYPERLIPIDEMIA TYPE: ICD-10-CM

## 2023-12-21 RX ORDER — GEMFIBROZIL 600 MG/1
600 TABLET, FILM COATED ORAL 2 TIMES DAILY
Qty: 180 TABLET | Refills: 3 | Status: SHIPPED | OUTPATIENT
Start: 2023-12-21

## 2023-12-27 ENCOUNTER — TELEMEDICINE (OUTPATIENT)
Dept: PHARMACY | Facility: HOSPITAL | Age: 63
End: 2023-12-27
Payer: COMMERCIAL

## 2023-12-27 DIAGNOSIS — E11.8 DIABETES MELLITUS WITH COMPLICATION (MULTI): ICD-10-CM

## 2023-12-27 NOTE — PROGRESS NOTES
Patient is sent at the request of Marlin Green* for my opinion regarding Type 2 diabetes.  My final recommendations will be communicated back to the requesting provider by way of shared medical record.    Subjective   HPI    Social History     Tobacco Use    Smoking status: Never    Smokeless tobacco: Never   Substance Use Topics    Alcohol use: Yes     Comment: occasional    Drug use: Yes        Current diet: Lots of carbohydrates, eggs in morning, likes salads/vegetables (limited access due to food [place] in [place]), dinners can vary but tend to be carb heavy  Current exercise: N/a    Allergies   Allergen Reactions    Amoxicillin-Pot Clavulanate Unknown    Lisinopril Unknown    Tetracycline Unknown     tongue swells    Sulfa (Sulfonamide Antibiotics) Hives and Rash       Current monitoring regimen:   Patient is using: continuous glucose monitor - FreeStyle Aida 3 up to 9 times a day    14 Day Av mg/dL  TIR: 27%  TAR: 73%  TBR: 0%    Any episodes of hypoglycemia? no    Objective     There were no vitals taken for this visit.    Diabetes Pharmacotherapy:    Trulicity 0.75 mg subcutaneously once weekly  Glimepiride 4 mg BID  Metformin 1000 mg BID  Lantus 42 units HS    Pre-breakfast N/A   Pre-lunch N/A   Pre-dinner N/A   Bedtime Lantus 42 units     Secondary Prevention:   - Statin? Yes  - ACE-I/ARB? No  - Aspirin? No    Lab Review  Lab Results   Component Value Date    BILITOT 0.5 2023    CALCIUM 9.6 2023    CO2 26 2023     2023    CREATININE 0.78 2023    GLUCOSE 233 (H) 2023    ALKPHOS 104 2023    K 4.3 2023    PROT 7.9 2023    PROT 7.9 2023    PROT 7.9 2023     2023    AST 20 2023    ALT 23 2023    BUN 11 2023    ANIONGAP 14 2023    MG 1.84 2023    ALBUMIN 4.4 2023    GFRF 85 2023     Lab Results   Component Value Date    TRIG 200 (H) 2023    CHOL 230 (H) 2023     HDL 39.8 (A) 04/27/2023     Lab Results   Component Value Date    HGBA1C 8.8 (A) 09/19/2023    HGBA1C 8.3 (A) 07/27/2023    HGBA1C 8.7 (A) 04/27/2023     The 10-year ASCVD risk score (Kristin DEVINE, et al., 2019) is: 9.7%    Values used to calculate the score:      Age: 63 years      Sex: Female      Is Non- : No      Diabetic: Yes      Tobacco smoker: No      Systolic Blood Pressure: 120 mmHg      Is BP treated: No      HDL Cholesterol: 39.8 mg/dL      Total Cholesterol: 230 mg/dL    Health Maintenance:   Foot Exam: N/a  Eye Exam: N/a    Assessment/Plan   Problem List Items Addressed This Visit       Diabetes mellitus with complication (CMS/[facility])       Patient's most recent A1c is above goal at 8.8% (Goal < 7%) as of 9/19/23. Due for A1c check at next PCP visit in February. Patient reports using [employer]de[employer] Aida 3 CGM with linda and is checking blood glucose up to 9 times a day. Has a 14 day average of 230 mg/dL, with a TIR of 23% and TAR of 77% with no lows. Patient reports eating more candy/sweets due to holidays and plans to make adjustments in the new year. Encouraged patient to continue diet changes such as having breakfast each morning and eating protein before carbohydrates for meals. Patient has started Trulicity 0.75 mg and is on the second or third dose. Reports feeling an upset stomach the first week but has since gone away with decreased appetite. Patient is routinely taking evening medications but is struggling to take morning doses due to waking up and eating breakfast irregularly. Emphasized to patient that taking doses each day is more important than the exact time, and establishing a morning routine with breakfast and medications after waking may help. Patient agreed and felt confident about making this change. Tried Metanx provided by podiatrist and felt some benefit with neuropathy. Suggested to buy over-the-counter vitamin B12 if availability and affordability continue to be  concerns.  Continue: Freestyle Aida 3 sensor, Trulicity 0.75 mg subcutaneous once weekly, glimepiride 4 mg BID, Metformin 1000 mg BID, Insulin glargine 42 units QHS   Evaluate for increased Trulicity dose at follow up visit. Patient notes many boxes left from previously not taking doses.  Discontinue: N/a   Education Provided to Patient:   Focus on solidifying one meal at a time. Eating breakfast consistently each morning with proteins will help keep you green throughout the day. Having a consistent plan for mornings will help with taking morning medications as part of a routine.  Follow-up: I recommend diabetes care be  2 weeks (Stephon 10 @ 1pm) .  PCP Follow-Up: 2/6/24    Chichi Moreno, PharmD    Continue all meds under the continuation of care with the referring provider and clinical pharmacy team.

## 2024-01-10 ENCOUNTER — TELEMEDICINE (OUTPATIENT)
Dept: PHARMACY | Facility: HOSPITAL | Age: 64
End: 2024-01-10
Payer: COMMERCIAL

## 2024-01-10 DIAGNOSIS — E11.8 DIABETES MELLITUS WITH COMPLICATION (MULTI): ICD-10-CM

## 2024-01-10 RX ORDER — DULAGLUTIDE 1.5 MG/.5ML
1.5 INJECTION, SOLUTION SUBCUTANEOUS
Qty: 2 ML | Refills: 2 | Status: SHIPPED | OUTPATIENT
Start: 2024-01-10 | End: 2024-05-29 | Stop reason: SDUPTHER

## 2024-01-10 NOTE — PROGRESS NOTES
Patient is sent at the request of Renata Green for my opinion regarding Type 2 diabetes.  My final recommendations will be communicated back to the requesting provider by way of shared medical record.    Subjective     Diabetes  She presents for her follow-up diabetic visit. She has type 2 diabetes mellitus. Her disease course has been worsening.     Past Medical History:  She has a past medical history of Acute sinusitis, unspecified (06/24/2013), CAD (coronary artery disease) (03/30/2023), Encounter for screening for infections with a predominantly sexual mode of transmission (07/09/2014), Migraine without aura, not intractable, without status migrainosus (06/24/2013), Other conditions influencing health status, Other enthesopathies, not elsewhere classified (05/16/2016), Personal history of colonic polyps (02/05/2015), Personal history of Methicillin resistant Staphylococcus aureus infection, Personal history of other diseases of the nervous system and sense organs (12/04/2014), Personal history of other diseases of the nervous system and sense organs, Personal history of other infectious and parasitic diseases (10/11/2016), Personal history of other medical treatment (05/16/2016), Personal history of urinary (tract) infections (06/25/2013), Positive LEANNE (antinuclear antibody) (03/30/2023), and Rotator cuff syndrome of right shoulder (03/30/2023).    Past Surgical History:  She has a past surgical history that includes Hysterectomy (03/25/2013); Carpal tunnel release (12/15/2017); Other surgical history (12/15/2017); and Other surgical history (06/25/2013).    Social History:  She reports that she has never smoked. She has never used smokeless tobacco. She reports current alcohol use. She reports current drug use.    Family History:  Family History   Problem Relation Name Age of Onset    Cervical cancer Mother      Hypertension Father      Pancreatic cancer Father      Coronary artery disease Maternal  "Grandmother      Diabetes Other uncle        Allergies:  Amoxicillin-pot clavulanate, Lisinopril, Tetracycline, and Sulfa (sulfonamide antibiotics)    Current diet:  Was not able to implement breakfast into routine over the past two weeks. Has more bread than she would like. Trying to incorporate more vegetables. Hardboiled eggs and small portion of fruit or protein bars planned for breakfast. Used to get very hungry and now does not experience as much hunger. Eating less overall.    Current exercise:  house work    Current monitoring regimen:   Patient is using: continuous glucose monitor - Freestyle Aida 3.  Patient reports her last sensor was \"bad\" and would not connect with her phone. Patient was advised by [employer] support to delete linda and re-download which resolved the issue. \"Bad\" sensor was replaced by . Patient reports she only has two days of data and her sugars are not good - lowest BG of 129 mg/dL. Most sugar readings at 200 mg/dL and some as high as 350 mg/dL.     Any episodes of hypoglycemia? no    Objective     Last Recorded Vitals:  There were no vitals filed for this visit.    Diabetes Pharmacotherapy:    Trulicity 0.75 mg subcutaneously once weekly  Glimepiride 4 mg BID  Metformin 1000 mg BID  Lantus 42 units HS     Pre-breakfast N/A   Pre-lunch N/A   Pre-dinner N/A   Bedtime Lantus 42 units     Primary/Secondary Prevention:   - Statin? Yes  - ACE-I/ARB? No  - Aspirin? No    Lab Review  Lab Results   Component Value Date    BILITOT 0.5 09/19/2023    CALCIUM 9.6 09/19/2023    CO2 26 09/19/2023     09/19/2023    CREATININE 0.78 09/19/2023    GLUCOSE 233 (H) 09/19/2023    ALKPHOS 104 09/19/2023    K 4.3 09/19/2023    PROT 7.9 09/19/2023    PROT 7.9 09/19/2023    PROT 7.9 09/19/2023     09/19/2023    AST 20 09/19/2023    ALT 23 09/19/2023    BUN 11 09/19/2023    ANIONGAP 14 09/19/2023    MG 1.84 04/29/2023    ALBUMIN 4.4 09/19/2023    GFRF 85 09/19/2023     Lab Results " "  Component Value Date    TRIG 200 (H) 04/27/2023    CHOL 230 (H) 04/27/2023    HDL 39.8 (A) 04/27/2023     Lab Results   Component Value Date    HGBA1C 8.8 (A) 09/19/2023    HGBA1C 8.3 (A) 07/27/2023    HGBA1C 8.7 (A) 04/27/2023     No components found for: \"UACR\"  The 10-year ASCVD risk score (Kristin DEVINE, et al., 2019) is: 9.7%    Values used to calculate the score:      Age: 63 years      Sex: Female      Is Non- : No      Diabetic: Yes      Tobacco smoker: No      Systolic Blood Pressure: 120 mmHg      Is BP treated: No      HDL Cholesterol: 39.8 mg/dL      Total Cholesterol: 230 mg/dL      Assessment/Plan   Problem List Items Addressed This Visit       Diabetes mellitus with complication (CMS/[facility])       Patient's most recent A1c is above goal at 8.8% as of 9/19/23 (Goal < 7%). Patient is due for A1c at next PCP visit on 2/6/24. Patient has struggled with establishing a regular routine that allows her to consistently take her AM medications. In the past two weeks, reports that she has been going to sleep at 4 am and waking at 4 pm. Moving forward, patient plans to eat breakfast when she gets up to take the oral medications. Every day when she wakes, she feeds her animals and will place a reminder note to eat a small breakfast and take medications. Otherwise, patient reports issues with her Freestyle sensor which were resolved when she contacted the . She reports tolerability with her current medication regimen. Has completed 5 doses of Trulicity 0.75 mg and is tolerating well. Patient reports that she experienced some nausea and fullness the first week which has resolved with subsequent doses. Patient has 4 total syringes left of the Trulicity 0.75 mg dose but reports that the medication is no cost for her and she is willing to increase dose now to obtain better BG control. Discussed potential increase in side effects for a few days following dose increase. Patient plans to " start increased dose next week. No other changes to medication regimen at this time. Encouraged patient to continue to initiate lifestyle changes (diet/exercise) and improve adherence to oral medications.   Initiate:   Trulicity 1.5 mg once weekly - [facility]  Missed dose yesterday, plan to inject 0.75 mg today and increase to Trulicity 1.5 mg once weekly starting next Tuesday 1/16/24  Continue:   Freestyle Aida 3 sensor  Glimepiride 4 mg BID  Metformin 1000 mg BID   Insulin glargine 42 units QHS    Compliance at present is estimated to be fair. Efforts to improve compliance (if necessary) will be directed at increased exercise and adherence to medication regimen .  Education Provided to Patient:   Increase in nausea and feelings of fullness may occur when dose of Trulicity is increased to 1.5 mg weekly.   Provided education on eating protein with meals to reduce overall carbohydrate intake.   Also encouraged increase in exercise including walking her dogs even for short amounts of time or short distances.    Follow-up: 1 month - 1/31 @ 1 pm   PCP Follow-Up: 2/6/24    Chichi Moreno, PharmD    Continue all meds under the continuation of care with the referring provider and clinical pharmacy team.

## 2024-01-14 DIAGNOSIS — Z00.00 ENCOUNTER FOR GENERAL ADULT MEDICAL EXAMINATION WITHOUT ABNORMAL FINDINGS: ICD-10-CM

## 2024-01-14 RX ORDER — ATORVASTATIN CALCIUM 80 MG/1
80 TABLET, FILM COATED ORAL DAILY
Qty: 90 TABLET | Refills: 3 | Status: SHIPPED | OUTPATIENT
Start: 2024-01-14

## 2024-01-25 DIAGNOSIS — F32.A DEPRESSION, UNSPECIFIED: ICD-10-CM

## 2024-01-25 RX ORDER — DULOXETIN HYDROCHLORIDE 60 MG/1
CAPSULE, DELAYED RELEASE ORAL
Qty: 90 CAPSULE | Refills: 1 | Status: SHIPPED | OUTPATIENT
Start: 2024-01-25

## 2024-01-31 ENCOUNTER — APPOINTMENT (OUTPATIENT)
Dept: PHARMACY | Facility: HOSPITAL | Age: 64
End: 2024-01-31
Payer: COMMERCIAL

## 2024-02-06 ENCOUNTER — APPOINTMENT (OUTPATIENT)
Dept: PRIMARY CARE | Facility: CLINIC | Age: 64
End: 2024-02-06
Payer: COMMERCIAL

## 2024-02-08 ENCOUNTER — APPOINTMENT (OUTPATIENT)
Dept: PHARMACY | Facility: HOSPITAL | Age: 64
End: 2024-02-08
Payer: COMMERCIAL

## 2024-02-17 DIAGNOSIS — E11.8 DIABETES MELLITUS WITH COMPLICATION (MULTI): ICD-10-CM

## 2024-02-19 RX ORDER — BLOOD-GLUCOSE SENSOR
EACH MISCELLANEOUS
Qty: 2 EACH | Refills: 2 | Status: SHIPPED | OUTPATIENT
Start: 2024-02-19 | End: 2024-05-17

## 2024-02-19 RX ORDER — ACETAMINOPHEN 500 MG
2000 TABLET ORAL DAILY
COMMUNITY
Start: 2024-01-26

## 2024-02-19 RX ORDER — INSULIN GLARGINE 100 [IU]/ML
INJECTION, SOLUTION SUBCUTANEOUS
Qty: 15 ML | Refills: 1 | Status: SHIPPED | OUTPATIENT
Start: 2024-02-19 | End: 2024-05-24

## 2024-02-28 ENCOUNTER — APPOINTMENT (OUTPATIENT)
Dept: PHARMACY | Facility: HOSPITAL | Age: 64
End: 2024-02-28
Payer: COMMERCIAL

## 2024-03-03 DIAGNOSIS — G25.81 RESTLESS LEGS SYNDROME: ICD-10-CM

## 2024-03-04 RX ORDER — ROPINIROLE 2 MG/1
2 TABLET, FILM COATED ORAL NIGHTLY
Qty: 90 TABLET | Refills: 0 | Status: SHIPPED | OUTPATIENT
Start: 2024-03-04 | End: 2024-05-24

## 2024-03-15 ENCOUNTER — TELEMEDICINE (OUTPATIENT)
Dept: PHARMACY | Facility: HOSPITAL | Age: 64
End: 2024-03-15
Payer: COMMERCIAL

## 2024-03-15 DIAGNOSIS — E11.8 DIABETES MELLITUS WITH COMPLICATION (MULTI): ICD-10-CM

## 2024-03-15 NOTE — PROGRESS NOTES
Patient is sent at the request of Renata Green for my opinion regarding Type 2 diabetes.  My final recommendations will be communicated back to the requesting provider by way of shared medical record.    Subjective     Diabetes  She presents for her follow-up diabetic visit. She has type 2 diabetes mellitus.     Past Medical History:  She has a past medical history of Acute sinusitis, unspecified (06/24/2013), CAD (coronary artery disease) (03/30/2023), Encounter for screening for infections with a predominantly sexual mode of transmission (07/09/2014), Migraine without aura, not intractable, without status migrainosus (06/24/2013), Other conditions influencing health status, Other enthesopathies, not elsewhere classified (05/16/2016), Personal history of colonic polyps (02/05/2015), Personal history of Methicillin resistant Staphylococcus aureus infection, Personal history of other diseases of the nervous system and sense organs (12/04/2014), Personal history of other diseases of the nervous system and sense organs, Personal history of other infectious and parasitic diseases (10/11/2016), Personal history of other medical treatment (05/16/2016), Personal history of urinary (tract) infections (06/25/2013), Positive LEANNE (antinuclear antibody) (03/30/2023), and Rotator cuff syndrome of right shoulder (03/30/2023).    Past Surgical History:  She has a past surgical history that includes Hysterectomy (03/25/2013); Carpal tunnel release (12/15/2017); Other surgical history (12/15/2017); and Other surgical history (06/25/2013).    Social History:  She reports that she has never smoked. She has never used smokeless tobacco. She reports current alcohol use. She reports current drug use.    Family History:  Family History   Problem Relation Name Age of Onset    Cervical cancer Mother      Hypertension Father      Pancreatic cancer Father      Coronary artery disease Maternal Grandmother      Diabetes Other uncle         Allergies:  Amoxicillin-pot clavulanate, Lisinopril, Tetracycline, and Sulfa (sulfonamide antibiotics)    Current diet:  trying to get in breakfast protein bars and take morning medications.   Current exercise: none    Current monitoring regimen:   Patient is using: continuous glucose monitor - Freestyle Aida 3    Range: 150-350 mg/dL   GMI: 10.2%  TAR: very high 64%, high 28%  TIR: 8%  TBR: 0%    Any episodes of hypoglycemia? no    Objective     Last Recorded Vitals:  There were no vitals filed for this visit.    Wt Readings from Last 6 Encounters:   07/27/23 92.3 kg (203 lb 6.4 oz)   04/27/23 94.3 kg (208 lb)   01/26/23 97.3 kg (214 lb 8 oz)   07/14/22 98.9 kg (218 lb 2 oz)   04/19/22 103 kg (227 lb 2 oz)   12/07/21 96.7 kg (213 lb 2 oz)     Diabetes Pharmacotherapy:    Insulin injections: Insulin dosage review with Mindy suggested noncompliance some of the time.    Pre-breakfast N/A   Pre-lunch N/A   Pre-dinner N/A   Bedtime Lantus 42 units   Insulin injections are given by patient.   Rotation of sites for injection: abdominal wall     Trulicity 1.5 mg subcutaneously once weekly (missed 4 doses)  Glimepiride 4 mg BID (misses AM dose nearly daily)  Metformin 1000 mg BID (misses AM dose nearly daily)    Primary/Secondary Prevention:   - Statin? Yes  - ACE-I/ARB? No  - Aspirin? No    Pertinent PMH Review:  - PMH of Pancreatitis: No  - PMH of Retinopathy: No  - PMH of Urinary Tract Infections: Yes  - PMH of MTC: No    Lab Review  Lab Results   Component Value Date    BILITOT 0.5 09/19/2023    CALCIUM 9.6 09/19/2023    CO2 26 09/19/2023     09/19/2023    CREATININE 0.78 09/19/2023    GLUCOSE 233 (H) 09/19/2023    ALKPHOS 104 09/19/2023    K 4.3 09/19/2023    PROT 7.9 09/19/2023    PROT 7.9 09/19/2023    PROT 7.9 09/19/2023     09/19/2023    AST 20 09/19/2023    ALT 23 09/19/2023    BUN 11 09/19/2023    ANIONGAP 14 09/19/2023    MG 1.84 04/29/2023    ALBUMIN 4.4 09/19/2023    GFRF 85 09/19/2023  "    Lab Results   Component Value Date    TRIG 200 (H) 04/27/2023    CHOL 230 (H) 04/27/2023    HDL 39.8 (A) 04/27/2023     Lab Results   Component Value Date    HGBA1C 8.8 (A) 09/19/2023    HGBA1C 8.3 (A) 07/27/2023    HGBA1C 8.7 (A) 04/27/2023     No components found for: \"UACR\"  The 10-year ASCVD risk score (Kristin DEVINE, et al., 2019) is: 9.7%    Values used to calculate the score:      Age: 63 years      Sex: Female      Is Non- : No      Diabetic: Yes      Tobacco smoker: No      Systolic Blood Pressure: 120 mmHg      Is BP treated: No      HDL Cholesterol: 39.8 mg/dL      Total Cholesterol: 230 mg/dL    Assessment/Plan   Problem List Items Addressed This Visit       Diabetes mellitus with complication (CMS/[facility])       Patients diabetes is uncontrolled with most recent A1c of 8.8% (Goal < 7%). Patient reports that she has been having a hard time maintaining her diabetes over the past several weeks. She placed her CGM sensor about 2 weeks ago after having it off for several weeks. She is due for a sensor change in a couple days. CGM data reviewed during visit and indicates majority of BG reading above goal. Patient stated that she missed 4 doses of Trulicity 1.5 mg weekly. Also reported missing morning doses of oral medications most days. Patient also took last dose of insulin last night. Patient reports her biggest barrier to adherence has been sleeping approximately 18 hours per day. She has follow up with PCP on 4/2/24 to evaluate depression. In the meantime, advised patient to resume Trulicity. Patient states that she has several doses of the 0.75 mg dose remaining. Advised patient to reinitiate Trulicity 0.75 mg weekly on Monday. Will follow up after 2 doses to assess up titration again. Also advised patient to contact pharmacy for refill. Advised patient on filling prescriptions with  to be mailed to her home. Patient declined at this time but will consider for future fills. Advised " continued monitoring of blood glucose with CGM and replacing sensors when due. Will continue to work toward more frequent adherence with morning oral medications.   Continue:   Restart Trulicity 0.75 mg once weekly  Freestyle Aida 3 sensor  Glimepiride 4 mg BID  Metformin 1000 mg BID   Insulin glargine 42 units QHS    Compliance at present is estimated to be poor. Efforts to improve compliance (if necessary) will be directed at  adherence to all diabetic medications .  Education Provided to Patient:   Counseled patient on Trulicity MOA, expectations, side effects, duration of therapy, administration, and monitoring parameters.  Provided detailed dosing and administration counseling to ensure proper technique.   Reviewed Trulicity titration schedule, starting with 0.75 mg once weekly to 1.5 mg, 3 mg, and if tolerated 4.5 mg.  Counseled patient on the benefits of GLP-1ra, such as cardiovascular risk reduction, glycemic control, and weight loss potential.  Reviewed storage requirements of Trulicity when not in use, and when to administer the medication if a dose is missed.  Advised patient that they may experience improved satiety after meals and portion sizes of meals may be reduced as doses of Trulicity increase.  Follow-up: 3/27/24 @ 11 am   PCP Follow-Up: 4/2/24    Chichi Moreno, [place]    Continue all meds under the continuation of care with the referring provider and clinical pharmacy team.

## 2024-03-27 ENCOUNTER — TELEMEDICINE (OUTPATIENT)
Dept: PHARMACY | Facility: HOSPITAL | Age: 64
End: 2024-03-27
Payer: COMMERCIAL

## 2024-03-27 DIAGNOSIS — E11.8 DIABETES MELLITUS WITH COMPLICATION (MULTI): ICD-10-CM

## 2024-03-27 NOTE — PROGRESS NOTES
Patient is sent at the request of Renata Green for my opinion regarding Type 2 diabetes.  My final recommendations will be communicated back to the requesting provider by way of shared medical record.    Subjective     Diabetes  She presents for her follow-up diabetic visit. She has type 2 diabetes mellitus. Her disease course has been fluctuating. Current diabetic treatment includes insulin injections and oral agent (dual therapy). She is compliant with treatment some of the time.     Past Medical History:  She has a past medical history of Acute sinusitis, unspecified (06/24/2013), CAD (coronary artery disease) (03/30/2023), Encounter for screening for infections with a predominantly sexual mode of transmission (07/09/2014), Migraine without aura, not intractable, without status migrainosus (06/24/2013), Other conditions influencing health status, Other enthesopathies, not elsewhere classified (05/16/2016), Personal history of colonic polyps (02/05/2015), Personal history of Methicillin resistant Staphylococcus aureus infection, Personal history of other diseases of the nervous system and sense organs (12/04/2014), Personal history of other diseases of the nervous system and sense organs, Personal history of other infectious and parasitic diseases (10/11/2016), Personal history of other medical treatment (05/16/2016), Personal history of urinary (tract) infections (06/25/2013), Positive LEANNE (antinuclear antibody) (03/30/2023), and Rotator cuff syndrome of right shoulder (03/30/2023).    Past Surgical History:  She has a past surgical history that includes Hysterectomy (03/25/2013); Carpal tunnel release (12/15/2017); Other surgical history (12/15/2017); and Other surgical history (06/25/2013).    Social History:  She reports that she has never smoked. She has never used smokeless tobacco. She reports current alcohol use. She reports current drug use.    Family History:  Family History   Problem  Relation Name Age of Onset    Cervical cancer Mother      Hypertension Father      Pancreatic cancer Father      Coronary artery disease Maternal Grandmother      Diabetes Other uncle        Allergies:  Amoxicillin-pot clavulanate, Lisinopril, Tetracycline, and Sulfa (sulfonamide antibiotics)    Current diet:  Ate a sub and potato chips yesterday, one salad per day with protein, cheese and dressing. Typically eats two meals per day.   Current exercise:  Not very much, planning to increase when the weather gets warmer    Current monitoring regimen:   Patient is using: continuous glucose monitor - Freestyle Aida 3 with phone application  One high episode yesterday - BG of 400 mg/dL; average in the 200's    Avg glucose (14 days): 238 mg/dL   TAR: very high 38%, high 43%  TIR: 19%  TBR: 0%    Any episodes of hypoglycemia? no    Objective     Last Recorded Vitals:  BP Readings from Last 6 Encounters:   07/27/23 120/78   04/27/23 125/82   01/26/23 130/80   07/14/22 130/80   04/19/22 128/80   12/07/21 110/64       Wt Readings from Last 6 Encounters:   07/27/23 92.3 kg (203 lb 6.4 oz)   04/27/23 94.3 kg (208 lb)   01/26/23 97.3 kg (214 lb 8 oz)   07/14/22 98.9 kg (218 lb 2 oz)   04/19/22 103 kg (227 lb 2 oz)   12/07/21 96.7 kg (213 lb 2 oz)       Diabetes Pharmacotherapy:    Insulin injections: Insulin dosage review with Mindy suggested noncompliance some of the time.    Pre-breakfast N/A   Pre-lunch N/A   Pre-dinner N/A   Bedtime Lantus 42 units   Insulin injections are given by patient.   Rotation of sites for injection: abdominal wall      Trulicity 1.5 mg subcutaneously once weekly (missed 4 doses)  Glimepiride 4 mg BID (misses AM dose nearly daily)  Metformin 1000 mg BID (misses AM dose nearly daily)    Adverse Effects: None     Primary/Secondary Prevention:   - Statin? Yes  - ACE-I/ARB? No  - Aspirin? No     Pertinent PMH Review:  - PMH of Pancreatitis: No  - PMH of Retinopathy: No  - PMH of Urinary Tract Infections:  "Yes  - PMH of MTC: No    Lab Review  Lab Results   Component Value Date    BILITOT 0.5 09/19/2023    CALCIUM 9.6 09/19/2023    CO2 26 09/19/2023     09/19/2023    CREATININE 0.78 09/19/2023    GLUCOSE 233 (H) 09/19/2023    ALKPHOS 104 09/19/2023    K 4.3 09/19/2023    PROT 7.9 09/19/2023    PROT 7.9 09/19/2023    PROT 7.9 09/19/2023     09/19/2023    AST 20 09/19/2023    ALT 23 09/19/2023    BUN 11 09/19/2023    ANIONGAP 14 09/19/2023    MG 1.84 04/29/2023    ALBUMIN 4.4 09/19/2023    GFRF 85 09/19/2023     Lab Results   Component Value Date    TRIG 200 (H) 04/27/2023    CHOL 230 (H) 04/27/2023    HDL 39.8 (A) 04/27/2023     Lab Results   Component Value Date    HGBA1C 8.8 (A) 09/19/2023    HGBA1C 8.3 (A) 07/27/2023    HGBA1C 8.7 (A) 04/27/2023     No components found for: \"UACR\"  The 10-year ASCVD risk score (Kristin DEVINE, et al., 2019) is: 9.7%    Values used to calculate the score:      Age: 63 years      Sex: Female      Is Non- : No      Diabetic: Yes      Tobacco smoker: No      Systolic Blood Pressure: 120 mmHg      Is BP treated: No      HDL Cholesterol: 39.8 mg/dL      Total Cholesterol: 230 mg/dL    Health Maintenance:   Foot Exam: not assessed  Eye Exam: not assessed    Assessment/Plan   Problem List Items Addressed This Visit       Diabetes mellitus with complication (CMS/[facility])       Patients diabetes is uncontrolled with most recent A1c of 8.8% on 9/9/23 (Goal < 7%). Patient is due for A1c and is planning to have it done at next PCP visit next week. Patient reports that she has been working to increase adherence and took her morning doses twice this week. Discussed potential solutions including moving medications to bedside table or placing bottles next to coffee pot. Patient wants to try placing at her bedside tablet the next two weeks. Will assess adherence at follow up visit. Patient has been adherent with CGM device. She is due for sensor change in 10 days. Reports " that she also reinitiated Trulicity after several missed doses. She missed her planned injection Monday. Advised that she must maintain 72 hours between injections. Will administer Trulicity 1.5 mg today. Patient reports adherence with evening medications including insulin. Will assess need for further dose adjustments following A1c next week. Persistent nonadherence continues to be a barrier in attaining optimal BG levels.   Restart Trulicity 1.5 mg once weekly  Continue:   Freestyle Aida 3 sensor  Glimepiride 4 mg BID  Metformin 1000 mg BID   Insulin glargine 42 units QHS   Compliance at present is estimated to be poor. Efforts to improve compliance (if necessary) will be directed at  adherence to all diabetic medications.  Education Provided to Patient:   Counseled patient on Trulicity MOA, expectations, side effects, duration of therapy, administration, and monitoring parameters.  Provided detailed dosing and administration counseling to ensure proper technique.   Reviewed Trulicity titration schedule, starting with 0.75 mg once weekly to 1.5 mg, 3 mg, and if tolerated 4.5 mg.  Counseled patient on the benefits of GLP-1ra, such as cardiovascular risk reduction, glycemic control, and weight loss potential.  Reviewed storage requirements of Trulicity when not in use, and when to administer the medication if a dose is missed.  Advised patient that they may experience improved satiety after meals and portion sizes of meals may be reduced as doses of Trulicity increase.   Follow-up: 2 weeks   PCP Follow-Up: 4/2/24    Chichi Moreno, PharmD    Continue all meds under the continuation of care with the referring provider and clinical pharmacy team.

## 2024-03-31 DIAGNOSIS — E03.9 HYPOTHYROIDISM, UNSPECIFIED TYPE: ICD-10-CM

## 2024-04-01 RX ORDER — LEVOTHYROXINE SODIUM 150 UG/1
TABLET ORAL
Qty: 72 TABLET | Refills: 2 | Status: SHIPPED | OUTPATIENT
Start: 2024-04-01

## 2024-04-02 ENCOUNTER — OFFICE VISIT (OUTPATIENT)
Dept: PRIMARY CARE | Facility: CLINIC | Age: 64
End: 2024-04-02
Payer: COMMERCIAL

## 2024-04-02 VITALS
HEART RATE: 87 BPM | OXYGEN SATURATION: 97 % | WEIGHT: 211.2 LBS | SYSTOLIC BLOOD PRESSURE: 147 MMHG | BODY MASS INDEX: 33.94 KG/M2 | HEIGHT: 66 IN | TEMPERATURE: 97 F | DIASTOLIC BLOOD PRESSURE: 84 MMHG

## 2024-04-02 DIAGNOSIS — E11.8 DIABETES MELLITUS WITH COMPLICATION (MULTI): ICD-10-CM

## 2024-04-02 DIAGNOSIS — Z12.31 ENCOUNTER FOR SCREENING MAMMOGRAM FOR MALIGNANT NEOPLASM OF BREAST: ICD-10-CM

## 2024-04-02 DIAGNOSIS — F32.2 SEVERE MAJOR DEPRESSION (MULTI): Primary | ICD-10-CM

## 2024-04-02 DIAGNOSIS — R03.0 ELEVATED BLOOD PRESSURE READING: ICD-10-CM

## 2024-04-02 LAB
POC ALBUMIN /CREATININE RATIO MANUALLY ENTERED: ABNORMAL UG/MG CREAT
POC HEMOGLOBIN A1C: 11.6 % (ref 4.2–6.5)
POC URINE ALBUMIN: 80 MG/L
POC URINE CREATININE: 200 MG/DL

## 2024-04-02 PROCEDURE — 3079F DIAST BP 80-89 MM HG: CPT | Performed by: FAMILY MEDICINE

## 2024-04-02 PROCEDURE — 2028F FOOT EXAM PERFORMED: CPT | Performed by: FAMILY MEDICINE

## 2024-04-02 PROCEDURE — 99214 OFFICE O/P EST MOD 30 MIN: CPT | Performed by: FAMILY MEDICINE

## 2024-04-02 PROCEDURE — 3008F BODY MASS INDEX DOCD: CPT | Performed by: FAMILY MEDICINE

## 2024-04-02 PROCEDURE — 83036 HEMOGLOBIN GLYCOSYLATED A1C: CPT | Performed by: FAMILY MEDICINE

## 2024-04-02 PROCEDURE — 82044 UR ALBUMIN SEMIQUANTITATIVE: CPT | Performed by: FAMILY MEDICINE

## 2024-04-02 PROCEDURE — 3077F SYST BP >= 140 MM HG: CPT | Performed by: FAMILY MEDICINE

## 2024-04-02 RX ORDER — BUPROPION HYDROCHLORIDE 100 MG/1
100 TABLET ORAL NIGHTLY
Qty: 30 TABLET | Refills: 11 | Status: SHIPPED | OUTPATIENT
Start: 2024-04-02 | End: 2025-04-02

## 2024-04-02 ASSESSMENT — ENCOUNTER SYMPTOMS
DEPRESSION: 0
OCCASIONAL FEELINGS OF UNSTEADINESS: 0
LOSS OF SENSATION IN FEET: 0

## 2024-04-02 ASSESSMENT — PATIENT HEALTH QUESTIONNAIRE - PHQ9
2. FEELING DOWN, DEPRESSED OR HOPELESS: NOT AT ALL
SUM OF ALL RESPONSES TO PHQ9 QUESTIONS 1 AND 2: 0
1. LITTLE INTEREST OR PLEASURE IN DOING THINGS: NOT AT ALL

## 2024-04-02 NOTE — PROGRESS NOTES
"Subjective   Patient ID: Mindy Dominguez is a 63 y.o. female who presents for Follow-up (Talking to pharmacist started  81 mg of Asprin  once a day started about 3 weeks ago).  HPI  Still has not gotten her colonoscopy done  BP elevated today, usually under control, patient will check BP at home and let know readings, if still high will start medication. Had stopped taking propranolol for almost a year, was causing fatigue  Has been having worsening depression, compliant with wellbutrin, cymbalta. Will go up on wellbutrin  Has diabetes, today's hba1c 11.6, worsened from before, admits she had not been taking her trulicity for months, restarted, as well kept forgetting to take her evening dosages of metformin and amaryl. Following with pharmacy clinic    Review of Systems  General: no fever  Eyes: no blurry vision  ENT: no sore throat, no ear pain  Resp: no cough, sob or wheezing  Cardio: no chest pain, no palpitations  Abd: no nausea/vomiting  : no dysuria, no increased urinary frequency      /84   Pulse 87   Temp 36.1 °C (97 °F)   Ht 1.664 m (5' 5.5\")   Wt 95.8 kg (211 lb 3.2 oz)   SpO2 97%   BMI 34.61 kg/m²       Objective   Physical Exam  Gen: NAD, alert  Head: normocephalic/atraumatic  Eyes: conjunctivae normal  Ears: canals clear bilaterally, TM normal   Nose: external nose normal   Oropharynx: clear   Resp: Clear to auscultation  CVS: Regular rate and rhythm  Abdomen: soft, NT, ND  Ext: no edema, NT of lower extremities  Neuro: gait normal     Assessment/Plan   Problem List Items Addressed This Visit       Diabetes mellitus with complication (CMS/HCC)    Relevant Orders    POCT glycosylated hemoglobin (Hb A1C) manually resulted (Completed)    POCT Albumin random urine manually resulted    Severe major depression (CMS/HCC) - Primary    Relevant Medications    buPROPion (Wellbutrin) 100 mg tablet     Other Visit Diagnoses       Encounter for screening mammogram for malignant neoplasm of breast    "     Relevant Orders    BI mammo bilateral screening tomosynthesis    Elevated blood pressure reading     Recheck next week

## 2024-04-04 DIAGNOSIS — F41.9 ANXIETY DISORDER, UNSPECIFIED: ICD-10-CM

## 2024-04-04 RX ORDER — CLONAZEPAM 0.5 MG/1
TABLET ORAL
Qty: 30 TABLET | Refills: 1 | Status: SHIPPED | OUTPATIENT
Start: 2024-04-04

## 2024-04-10 ENCOUNTER — TELEMEDICINE (OUTPATIENT)
Dept: PHARMACY | Facility: HOSPITAL | Age: 64
End: 2024-04-10
Payer: COMMERCIAL

## 2024-04-10 DIAGNOSIS — E11.8 DIABETES MELLITUS WITH COMPLICATION (MULTI): ICD-10-CM

## 2024-04-10 NOTE — PROGRESS NOTES
Patient is sent at the request of Renata Green for my opinion regarding Type 2 diabetes.  My final recommendations will be communicated back to the requesting provider by way of shared medical record.    Subjective     Diabetes  She presents for her follow-up diabetic visit. She has type 2 diabetes mellitus. Her disease course has been worsening. Current diabetic treatment includes oral agent (dual therapy) and insulin injections. She is compliant with treatment some of the time.     Past Medical History:  She has a past medical history of Acute sinusitis, unspecified (06/24/2013), CAD (coronary artery disease) (03/30/2023), Encounter for screening for infections with a predominantly sexual mode of transmission (07/09/2014), Migraine without aura, not intractable, without status migrainosus (06/24/2013), Other conditions influencing health status, Other enthesopathies, not elsewhere classified (05/16/2016), Personal history of colonic polyps (02/05/2015), Personal history of Methicillin resistant Staphylococcus aureus infection, Personal history of other diseases of the nervous system and sense organs (12/04/2014), Personal history of other diseases of the nervous system and sense organs, Personal history of other infectious and parasitic diseases (10/11/2016), Personal history of other medical treatment (05/16/2016), Personal history of urinary (tract) infections (06/25/2013), Positive LEANNE (antinuclear antibody) (03/30/2023), and Rotator cuff syndrome of right shoulder (03/30/2023).    Past Surgical History:  She has a past surgical history that includes Hysterectomy (03/25/2013); Carpal tunnel release (12/15/2017); Other surgical history (12/15/2017); and Other surgical history (06/25/2013).    Social History:  She reports that she has never smoked. She has never used smokeless tobacco. She reports current alcohol use. She reports current drug use.    Family History:  Family History   Problem Relation  Name Age of Onset    Cervical cancer Mother      Hypertension Father      Pancreatic cancer Father      Coronary artery disease Maternal Grandmother      Diabetes Other uncle        Allergies:  Amoxicillin-pot clavulanate, Lisinopril, Tetracycline, and Sulfa (sulfonamide antibiotics)    Current diet:  Wants to cut back on sweets. Patient [place] Only one grocery store in her area. Purchasing more frozen vegetables to steam with protein. Prepared salads with toppings and dressing. Frozen dinners and pizza. Eggs in the morning with toast. Avoids sugary drinks but does have diet pop.   Current exercise: walking now that the weather is nicer    Current monitoring regimen:   Patient is using: continuous glucose monitor - Freestyle Aida 3    Average BG:   TAR: very high 50%, high 33%  TIR: 17%  TBR: 0%    Any episodes of hypoglycemia? yes - 1 low <70 mg/dL after being over 400 mg/dL   Adverse Effects: None    Objective     Last Recorded Vitals:  BP Readings from Last 6 Encounters:   04/02/24 147/84   07/27/23 120/78   04/27/23 125/82   01/26/23 130/80   07/14/22 130/80   04/19/22 128/80       Wt Readings from Last 6 Encounters:   04/02/24 95.8 kg (211 lb 3.2 oz)   07/27/23 92.3 kg (203 lb 6.4 oz)   04/27/23 94.3 kg (208 lb)   01/26/23 97.3 kg (214 lb 8 oz)   07/14/22 98.9 kg (218 lb 2 oz)   04/19/22 103 kg (227 lb 2 oz)       Diabetes Pharmacotherapy:    Insulin injections: Insulin dosage review with Mindy suggested noncompliance some of the time.    Pre-breakfast N/A   Pre-lunch N/A   Pre-dinner N/A   Bedtime Lantus 42 units   Insulin injections are given by patient.   Rotation of sites for injection: abdominal wall      Trulicity 1.5 mg subcutaneously once weekly (2 doses, takes on Mondays)  Glimepiride 4 mg BID (misses AM dose nearly daily)  Metformin 1000 mg BID (misses AM dose nearly daily)     Adherence: ongoing issues; the past couple days no issues with adherence  Adverse  "Effects: None      Primary/Secondary Prevention:   - Statin? Yes  - ACE-I/ARB? No  - Aspirin? No     Pertinent PMH Review:  - PMH of Pancreatitis: No  - PMH of Retinopathy: No  - PMH of Urinary Tract Infections: Yes  - PMH of MTC: No  - PMH of ASCVD: Yes  - PMH of CKD: No  - PMH of HF: No   - PMH of Obesity: Yes    Lab Review  Lab Results   Component Value Date    BILITOT 0.5 09/19/2023    CALCIUM 9.6 09/19/2023    CO2 26 09/19/2023     09/19/2023    CREATININE 0.78 09/19/2023    GLUCOSE 233 (H) 09/19/2023    ALKPHOS 104 09/19/2023    K 4.3 09/19/2023    PROT 7.9 09/19/2023    PROT 7.9 09/19/2023    PROT 7.9 09/19/2023     09/19/2023    AST 20 09/19/2023    ALT 23 09/19/2023    BUN 11 09/19/2023    ANIONGAP 14 09/19/2023    MG 1.84 04/29/2023    ALBUMIN 4.4 09/19/2023    GFRF 85 09/19/2023     Lab Results   Component Value Date    TRIG 200 (H) 04/27/2023    CHOL 230 (H) 04/27/2023    HDL 39.8 (A) 04/27/2023     Lab Results   Component Value Date    HGBA1C 11.6 (A) 04/02/2024    HGBA1C 8.8 (A) 09/19/2023    HGBA1C 8.3 (A) 07/27/2023     No components found for: \"UACR\"  The 10-year ASCVD risk score (Kristin DEVINE, et al., 2019) is: 14.1%    Values used to calculate the score:      Age: 63 years      Sex: Female      Is Non- : No      Diabetic: Yes      Tobacco smoker: No      Systolic Blood Pressure: 147 mmHg      Is BP treated: No      HDL Cholesterol: 39.8 mg/dL      Total Cholesterol: 230 mg/dL    Health Maintenance:   Foot Exam: Follows at [facility]   Eye Exam: Over 2 years - Needs referral to ophthalmologist     Drug Interactions:  Insulin glargine and Trulicity (category D DDI) enhanced hypoglycemic effects  Glimepiride and Trulicity (category D DDI) enhanced hypoglycemic effects  No issues with hypoglycemia reported. Will continue to monitor and dose adjust as needed.     Assessment/Plan   Problem List Items Addressed This Visit       Diabetes mellitus with complication " (CMS/[facility])    Relevant Orders    Follow Up In Clinical Pharmacy       Patients diabetes is  worsening  with most recent A1c of 11.6% (Goal < 7%). Previous A1c of 8.3% on 9/19/23. Patient has been struggling with adherence to diabetic regimen. Reports that the past few days have gone well and she did not miss any doses. Patient has taken Trulicity 1.5 mg once weekly x2 doses. No side effects reports although states she has less of an appetite. No weight loss per scale but notices that her clothes are fitting looser. Will follow up in two weeks to increase weekly dose. Patient reports no issues with other medications on her regimen. Given that she is on the maximum doses of glimepiride and metformin, plan to increase the insulin glargine today. Advised patient to increase from 42 units at bedtime to 46 units daily at bedtime. Patient verbalized understanding and will monitor for any hypoglycemic events. Given increase in A1c will continue to titrate Trulicity and insulin to attain A1c goals. May also consider addition of an SGLT2 although given patient has history of UTIs and current adherence issues will work to up titrate current medications for now. Discussed lifestyle and dietary modification at length. Patient wants to work on cutting back on sweets and eating more balanced meals. Discussed the diabetic diet, limiting carbohydrate intake, and reducing portion sizes. Will follow up on lifestyle modifications at next visit.   Increase   Lantus to 46 units at bedtime   Continue:   Trulicity 1.5 mg once weekly   Glimepiride 4 mg BID   Metformin 1000 mg BID   Compliance at present is estimated to be fair. Efforts to improve compliance (if necessary) will be directed at dietary modifications: lower carbohydrate intake and increase adherence to medications .  Education Provided to Patient:   Advised patient to check BG if experiencing any signs or symptoms of hypoglycemia. If the BG is less than 70 mg/dL - follow the  rule of 15. Consume 15g of sugars for example, a glass of milk, 4 oz of orange juice, full sugar candy, and glucose tablets. Check BG in 15 minutes and if back to >70 mg/dL, consume a meal to keep the BG from going low. If the BG is less than 40 mg/dL - seek medical help.   Follow-up: 2 weeks   PCP Follow-Up: 7/3/24    Chichi Moreno PharmD    Continue all meds under the continuation of care with the referring provider and clinical pharmacy team.

## 2024-04-17 DIAGNOSIS — I10 HYPERTENSION, UNSPECIFIED TYPE: Primary | ICD-10-CM

## 2024-04-17 RX ORDER — AMLODIPINE BESYLATE 2.5 MG/1
2.5 TABLET ORAL DAILY
Qty: 30 TABLET | Refills: 5 | Status: SHIPPED | OUTPATIENT
Start: 2024-04-17 | End: 2024-10-14

## 2024-04-24 ENCOUNTER — TELEMEDICINE (OUTPATIENT)
Dept: PHARMACY | Facility: HOSPITAL | Age: 64
End: 2024-04-24
Payer: COMMERCIAL

## 2024-04-24 DIAGNOSIS — E11.8 DIABETES MELLITUS WITH COMPLICATION (MULTI): ICD-10-CM

## 2024-04-24 NOTE — PROGRESS NOTES
Patient is sent at the request of Renata Green for my opinion regarding Type 2 diabetes.  My final recommendations will be communicated back to the requesting provider by way of shared medical record.    Subjective     Diabetes  She presents for her follow-up diabetic visit. She has type 2 diabetes mellitus. Her disease course has been fluctuating.       Past Medical History:  She has a past medical history of Acute sinusitis, unspecified (06/24/2013), CAD (coronary artery disease) (03/30/2023), Encounter for screening for infections with a predominantly sexual mode of transmission (07/09/2014), Migraine without aura, not intractable, without status migrainosus (06/24/2013), Other conditions influencing health status, Other enthesopathies, not elsewhere classified (05/16/2016), Personal history of colonic polyps (02/05/2015), Personal history of Methicillin resistant Staphylococcus aureus infection, Personal history of other diseases of the nervous system and sense organs (12/04/2014), Personal history of other diseases of the nervous system and sense organs, Personal history of other infectious and parasitic diseases (10/11/2016), Personal history of other medical treatment (05/16/2016), Personal history of urinary (tract) infections (06/25/2013), Positive LEANNE (antinuclear antibody) (03/30/2023), and Rotator cuff syndrome of right shoulder (03/30/2023).    Past Surgical History:  She has a past surgical history that includes Hysterectomy (03/25/2013); Carpal tunnel release (12/15/2017); Other surgical history (12/15/2017); and Other surgical history (06/25/2013).    Social History:  She reports that she has never smoked. She has never used smokeless tobacco. She reports current alcohol use. She reports current drug use.    Family History:  Family History   Problem Relation Name Age of Onset    Cervical cancer Mother      Hypertension Father      Pancreatic cancer Father      Coronary artery disease  Maternal Grandmother      Diabetes Other uncle        Allergies:  Amoxicillin-pot clavulanate, Lisinopril, Tetracycline, and Sulfa (sulfonamide antibiotics)    Current diet:  Overall, healthier. Eats more salads with chickpeas or beans and frozen vegetables instead of cookies and candy bars. When eats sweets, tries to limit the portions.  Current exercise:  Went on a walk Wednesday and Thursday for 45 minutes from which she developed bleeding in toes as they hit the front of the shoes. Saw a podiatrist who took care of the toes and recommended thicker socks as well as limiting walking to 20 minutes and increasing as tolerated, has podiatry recommended shoes.    Current monitoring regimen:   Patient is using: continuous glucose monitor - Zap[employer]ch 3     In the past 14 days:   TAR: very high 20%, high 29%  TIR: 51%  TBR: 0%    Any episodes of hypoglycemia? yes, one episode of BG around 67-69 at 4AM. Patient reports not taking any food as the BG increased after walking.       Objective     Last Recorded Vitals:  BP Readings from Last 6 Encounters:   04/02/24 147/84   07/27/23 120/78   04/27/23 125/82   01/26/23 130/80   07/14/22 130/80   04/19/22 128/80       Wt Readings from Last 6 Encounters:   04/02/24 95.8 kg (211 lb 3.2 oz)   07/27/23 92.3 kg (203 lb 6.4 oz)   04/27/23 94.3 kg (208 lb)   01/26/23 97.3 kg (214 lb 8 oz)   07/14/22 98.9 kg (218 lb 2 oz)   04/19/22 103 kg (227 lb 2 oz)       Diabetes Pharmacotherapy:    Insulin injections: Insulin dosage review with Mindy suggested noncompliance some of the time.    Pre-breakfast N/A   Pre-lunch N/A   Pre-dinner N/A   Bedtime Lantus 42 units   Insulin injections are given by patient.   Rotation of sites for injection: abdominal wall      Trulicity 1.5 mg subcutaneously once weekly (missed two doses)  Glimepiride 4 mg BID (misses AM dose nearly daily)  Metformin 1000 mg BID (misses AM dose nearly daily)     Adherence: ongoing issues  Metformin and glimepiride:  "reports taking metformin and glimepiride for the the past couple of days; struggles taking the morning doses since does not eat breakfast until has been up for awhile. Continued to try to eat a little snack in the morning.   Trulicity: switching taking from Mondays to Wednesdays. Patient set an alarm for Wednesdays.  Adverse Effects: None    Primary/Secondary Prevention:   - Statin? Yes  - ACE-I/ARB? No  - Aspirin? No    Pertinent PMH Review:  - PMH of Pancreatitis: No  - PMH of Retinopathy: No  - PMH of Urinary Tract Infections: Yes  - PMH of MTC: No  - PMH of ASCVD: Yes  - PMH of CKD: No  - PMH of HF: No   - PMH of Obesity: Yes    Lab Review  Lab Results   Component Value Date    BILITOT 0.5 09/19/2023    CALCIUM 9.6 09/19/2023    CO2 26 09/19/2023     09/19/2023    CREATININE 0.78 09/19/2023    GLUCOSE 233 (H) 09/19/2023    ALKPHOS 104 09/19/2023    K 4.3 09/19/2023    PROT 7.9 09/19/2023    PROT 7.9 09/19/2023    PROT 7.9 09/19/2023     09/19/2023    AST 20 09/19/2023    ALT 23 09/19/2023    BUN 11 09/19/2023    ANIONGAP 14 09/19/2023    MG 1.84 04/29/2023    ALBUMIN 4.4 09/19/2023    GFRF 85 09/19/2023     Lab Results   Component Value Date    TRIG 200 (H) 04/27/2023    CHOL 230 (H) 04/27/2023    HDL 39.8 (A) 04/27/2023     Lab Results   Component Value Date    HGBA1C 11.6 (A) 04/02/2024    HGBA1C 8.8 (A) 09/19/2023    HGBA1C 8.3 (A) 07/27/2023     No components found for: \"UACR\"  The 10-year ASCVD risk score (Kristin DEVINE, et al., 2019) is: 18.7%    Values used to calculate the score:      Age: 63 years      Sex: Female      Is Non- : No      Diabetic: Yes      Tobacco smoker: No      Systolic Blood Pressure: 147 mmHg      Is BP treated: Yes      HDL Cholesterol: 39.8 mg/dL      Total Cholesterol: 230 mg/dL    Health Maintenance:   Foot Exam: Follows at [facility]   Eye Exam: Over 2 years - Needs referral to ophthalmologist      Drug Interactions:  Insulin glargine and " Trulicity (category D DDI) enhanced hypoglycemic effects  Glimepiride and Trulicity (category D DDI) enhanced hypoglycemic effects  No issues with hypoglycemia reported. Will continue to monitor and dose adjust as needed.     Assessment/Plan   Problem List Items Addressed This Visit       Diabetes mellitus with complication (Multi)       Patients diabetes is above goal < 7% with most recent A1c of 11.6%. A1c on 9/19/23 was 8.3%. Patient has been struggling with adherence to diabetic regimen. Reports that the past few days she did not miss any doses of metformin and glimepiride, however, patient reports missing doses of Trulicity. Educated patient on the importance of adherence to Trulicity and emphasizing its cardiovascular and weight loss benefits. Patient is not experiencing any side effects at this time. Patient is concerned with low BG episodes as she takes glimepiride and metformin. She believes that since she was not taking the medications consistently, the doses and the amount of medications were continuously being increased. At this time, one episode of hypoglycemia has been reported in the past 14 days with a TBR of 0%. Writer is not currently concerned given continuous glucose monitor data and most recent A1c. Patient was provided hypoglycemia education. Will continue to monitor. Patient expressed that making small changes in diet and adherence work better for her than cutting all enjoyable foods out. Encouraged patient to continue eating more balances meals and reducing portion size. Given that the patient missed two doses of Trulicity, continue 1.5mg for three weeks, then increase to 3mg.   Increase:   Trulicity 3 mg once weekly  Once patient completes 1.5 mg injections (3 pens remaining)  Continue:   Lantus to 46 units at bedtime    Glimepiride 4 mg BID   Metformin 1000 mg BID   Compliance at present is estimated to be poor. Efforts to improve compliance (if necessary) will be directed at dietary  modifications: lower carbohydrate intake and increase adherence to medications .  Education Provided to Patient: Discussed taking metformin and glimepiride with meals and if not, eating a snack to prevent potential hypoglycemia.  Follow-up: I recommend diabetes care be  2 weeks .  PCP Follow-Up: 7/3/2024    Valeria Granger, PharmD  PGY-1 Pharmacy Resident    Continue all meds under the continuation of care with the referring provider and clinical pharmacy team.

## 2024-04-26 NOTE — PROGRESS NOTES
I reviewed the pharmacy resident's documentation and discussed the patient with the resident. I agree with the resident medical decision making as documented in the note.     Chichi Moreno, PharmD

## 2024-05-09 ENCOUNTER — HOSPITAL ENCOUNTER (OUTPATIENT)
Dept: RADIOLOGY | Facility: HOSPITAL | Age: 64
Discharge: HOME | End: 2024-05-09
Payer: COMMERCIAL

## 2024-05-09 VITALS — HEIGHT: 65 IN | WEIGHT: 205 LBS | BODY MASS INDEX: 34.16 KG/M2

## 2024-05-09 DIAGNOSIS — Z12.31 ENCOUNTER FOR SCREENING MAMMOGRAM FOR MALIGNANT NEOPLASM OF BREAST: ICD-10-CM

## 2024-05-09 PROCEDURE — 77067 SCR MAMMO BI INCL CAD: CPT | Performed by: STUDENT IN AN ORGANIZED HEALTH CARE EDUCATION/TRAINING PROGRAM

## 2024-05-09 PROCEDURE — 77063 BREAST TOMOSYNTHESIS BI: CPT | Performed by: STUDENT IN AN ORGANIZED HEALTH CARE EDUCATION/TRAINING PROGRAM

## 2024-05-09 PROCEDURE — 77067 SCR MAMMO BI INCL CAD: CPT

## 2024-05-15 ENCOUNTER — TELEMEDICINE (OUTPATIENT)
Dept: PHARMACY | Facility: HOSPITAL | Age: 64
End: 2024-05-15
Payer: COMMERCIAL

## 2024-05-15 DIAGNOSIS — E11.8 DIABETES MELLITUS WITH COMPLICATION (MULTI): ICD-10-CM

## 2024-05-15 RX ORDER — BLOOD-GLUCOSE,RECEIVER,CONT
EACH MISCELLANEOUS
Qty: 1 EACH | Refills: 0 | Status: SHIPPED | OUTPATIENT
Start: 2024-05-15

## 2024-05-15 NOTE — PROGRESS NOTES
Patient is sent at the request of Renata Green for my opinion regarding Type 2 diabetes.  My final recommendations will be communicated back to the requesting provider by way of shared medical record.    Subjective     Diabetes  She presents for her follow-up diabetic visit. She has type 2 diabetes mellitus. Her disease course has been improving. Current diabetic treatment includes insulin injections and oral agent (dual therapy). She is compliant with treatment some of the time.     Past Medical History:  She has a past medical history of Acute sinusitis, unspecified (06/24/2013), CAD (coronary artery disease) (03/30/2023), Encounter for screening for infections with a predominantly sexual mode of transmission (07/09/2014), Migraine without aura, not intractable, without status migrainosus (06/24/2013), Other conditions influencing health status, Other enthesopathies, not elsewhere classified (05/16/2016), Personal history of colonic polyps (02/05/2015), Personal history of Methicillin resistant Staphylococcus aureus infection, Personal history of other diseases of the nervous system and sense organs (12/04/2014), Personal history of other diseases of the nervous system and sense organs, Personal history of other infectious and parasitic diseases (10/11/2016), Personal history of other medical treatment (05/16/2016), Personal history of urinary (tract) infections (06/25/2013), Positive LEANNE (antinuclear antibody) (03/30/2023), and Rotator cuff syndrome of right shoulder (03/30/2023).    Past Surgical History:  She has a past surgical history that includes Hysterectomy (03/25/2013); Carpal tunnel release (12/15/2017); Other surgical history (12/15/2017); Other surgical history (06/25/2013); and Excision / biopsy breast / nipple / duct (Left, 05/09/2014).    Social History:  She reports that she has never smoked. She has never used smokeless tobacco. She reports current alcohol use. She reports current  drug use.    Family History:  Family History   Problem Relation Name Age of Onset    Cervical cancer Mother      Hypertension Father      Pancreatic cancer Father      Coronary artery disease Maternal Grandmother      Diabetes Other uncle        Allergies:  Amoxicillin-pot clavulanate, Lisinopril, Tetracycline, and Sulfa (sulfonamide antibiotics)    Current diet:  No candies or cookies in the past week. Salad with protein every day.   Current exercise:  getting outside and walking around more now that the weather is better     Current monitoring regimen:   Patient is using: continuous glucose monitor - Freestyle Aida 3    New sensor not synching with phone. This has been a recurrent issue, plan to send in new prescription for Freestyle Aida 3 reader.     30-day   TAR: 18% very high + 28% high = 46%   TIR: 52%  TBR: 2%     Any episodes of hypoglycemia? yes - middle of the night; alarm woke her up     Adverse Effects:   None    Objective     Last Recorded Vitals:  BP Readings from Last 6 Encounters:   04/02/24 147/84   07/27/23 120/78   04/27/23 125/82   01/26/23 130/80   07/14/22 130/80   04/19/22 128/80       Wt Readings from Last 6 Encounters:   05/09/24 93 kg (205 lb)   04/02/24 95.8 kg (211 lb 3.2 oz)   07/27/23 92.3 kg (203 lb 6.4 oz)   04/27/23 94.3 kg (208 lb)   01/26/23 97.3 kg (214 lb 8 oz)   07/14/22 98.9 kg (218 lb 2 oz)       Diabetes Pharmacotherapy:    Insulin injections: Insulin dosage review with Mindy suggested noncompliance some of the time.    Pre-breakfast N/A   Pre-lunch N/A   Pre-dinner N/A   Bedtime Lantus 46 units   Insulin injections are given by patient.   Rotation of sites for injection: abdominal wall      Trulicity 1.5 mg subcutaneously once weekly (missed one 1.5 mg dose and took 0.75 mg dose this past Monday)  Glimepiride 4 mg BID (has been consistent with AM doses x1 week)  Metformin 1000 mg BID (has been consistent with AM doses x1 week)    Adverse Effects: None    "  Primary/Secondary Prevention:   - Statin? Yes  - ACE-I/ARB? No  - Aspirin? No     Pertinent PMH Review:  - PMH of Pancreatitis: No  - PMH of Retinopathy: No  - PMH of Urinary Tract Infections: Yes  - PMH of MTC: No  - PMH of ASCVD: Yes  - PMH of CKD: No  - PMH of HF: No   - PMH of Obesity: Ye    Lab Review  Lab Results   Component Value Date    BILITOT 0.5 09/19/2023    CALCIUM 9.6 09/19/2023    CO2 26 09/19/2023     09/19/2023    CREATININE 0.78 09/19/2023    GLUCOSE 233 (H) 09/19/2023    ALKPHOS 104 09/19/2023    K 4.3 09/19/2023    PROT 7.9 09/19/2023    PROT 7.9 09/19/2023    PROT 7.9 09/19/2023     09/19/2023    AST 20 09/19/2023    ALT 23 09/19/2023    BUN 11 09/19/2023    ANIONGAP 14 09/19/2023    MG 1.84 04/29/2023    ALBUMIN 4.4 09/19/2023    GFRF 85 09/19/2023     Lab Results   Component Value Date    TRIG 200 (H) 04/27/2023    CHOL 230 (H) 04/27/2023    HDL 39.8 (A) 04/27/2023     Lab Results   Component Value Date    HGBA1C 11.6 (A) 04/02/2024    HGBA1C 8.8 (A) 09/19/2023    HGBA1C 8.3 (A) 07/27/2023     No components found for: \"UACR\"  The 10-year ASCVD risk score (Kristin DEVINE, et al., 2019) is: 18.7%    Values used to calculate the score:      Age: 63 years      Sex: Female      Is Non- : No      Diabetic: Yes      Tobacco smoker: No      Systolic Blood Pressure: 147 mmHg      Is BP treated: Yes      HDL Cholesterol: 39.8 mg/dL      Total Cholesterol: 230 mg/dL    Drug Interactions:  None     Assessment/Plan   Problem List Items Addressed This Visit       Diabetes mellitus with complication (Multi)    Relevant Medications    FreeStyle Aida 3 [place] misc    dulaglutide 3 mg/0.5 mL pen injector (Start on 5/19/2024)    Other Relevant Orders    Follow Up In Clinical Pharmacy       Patients diabetes is above goal < 7% with most recent A1c of 11.6% on 4/2/24. A1c on 9/19/23 was 8.3%. Patient has been struggling with adherence to diabetic regimen although has been " consistent with no missed doses in the past week. Patient reports running out of Trulicity 1.5 mg. New prescription sent for 3 mg dose. Discussed backorder issues with patient and discussed that alternatives may be considered if access is an issue. Patient also reports that she has been having issues with connecting her sensors to her phone. This has been a recurrent issue and will send prescription for Freestyle Aida 3 reader. Will follow up in 2 weeks to assess tolerability of Trulicity 3 mg.   Initiate:   Trulicity 3 mg once weekly   Continue:   Lantus to 46 units at bedtime    Glimepiride 4 mg BID   Metformin 1000 mg BID  Compliance at present is estimated to be fair. Efforts to improve compliance (if necessary) will be directed at dietary modifications: lower carbohydrate intake and increase adherence to medications.   Education Provided to Patient:    Counseled patient on Trulicity MOA, expectations, side effects, duration of therapy, administration, and monitoring parameters.  Provided detailed dosing and administration counseling to ensure proper technique.   Reviewed Trulicity titration schedule, starting with 0.75 mg once weekly to 1.5 mg, 3 mg, and if tolerated 4.5 mg.  Counseled patient on the benefits of GLP-1ra, such as cardiovascular risk reduction, glycemic control, and weight loss potential.  Reviewed storage requirements of Trulicity when not in use, and when to administer the medication if a dose is missed.  Advised patient that they may experience improved satiety after meals and portion sizes of meals may be reduced as doses of Trulicity increase.  Follow-up: 2 weeks - 5/29 @ 11 am   PCP Follow-Up: 7/3/24    Chichi Moreno, PharmD    Continue all meds under the continuation of care with the referring provider and clinical pharmacy team.

## 2024-05-16 DIAGNOSIS — E11.8 DIABETES MELLITUS WITH COMPLICATION (MULTI): ICD-10-CM

## 2024-05-17 ENCOUNTER — OFFICE VISIT (OUTPATIENT)
Dept: OBSTETRICS AND GYNECOLOGY | Facility: HOSPITAL | Age: 64
End: 2024-05-17
Payer: COMMERCIAL

## 2024-05-17 VITALS
HEIGHT: 65 IN | BODY MASS INDEX: 34.16 KG/M2 | DIASTOLIC BLOOD PRESSURE: 80 MMHG | SYSTOLIC BLOOD PRESSURE: 143 MMHG | WEIGHT: 205 LBS

## 2024-05-17 DIAGNOSIS — N95.2 VAGINAL ATROPHY: Primary | ICD-10-CM

## 2024-05-17 PROCEDURE — 3077F SYST BP >= 140 MM HG: CPT | Performed by: OBSTETRICS & GYNECOLOGY

## 2024-05-17 PROCEDURE — 99386 PREV VISIT NEW AGE 40-64: CPT | Performed by: OBSTETRICS & GYNECOLOGY

## 2024-05-17 PROCEDURE — 3008F BODY MASS INDEX DOCD: CPT | Performed by: OBSTETRICS & GYNECOLOGY

## 2024-05-17 PROCEDURE — 2028F FOOT EXAM PERFORMED: CPT | Performed by: OBSTETRICS & GYNECOLOGY

## 2024-05-17 PROCEDURE — 1036F TOBACCO NON-USER: CPT | Performed by: OBSTETRICS & GYNECOLOGY

## 2024-05-17 PROCEDURE — 3079F DIAST BP 80-89 MM HG: CPT | Performed by: OBSTETRICS & GYNECOLOGY

## 2024-05-17 RX ORDER — BLOOD-GLUCOSE SENSOR
EACH MISCELLANEOUS
Qty: 2 EACH | Refills: 2 | Status: SHIPPED | OUTPATIENT
Start: 2024-05-17

## 2024-05-17 SDOH — ECONOMIC STABILITY: FOOD INSECURITY: WITHIN THE PAST 12 MONTHS, YOU WORRIED THAT YOUR FOOD WOULD RUN OUT BEFORE YOU GOT MONEY TO BUY MORE.: NEVER TRUE

## 2024-05-17 SDOH — ECONOMIC STABILITY: FOOD INSECURITY: WITHIN THE PAST 12 MONTHS, THE FOOD YOU BOUGHT JUST DIDN'T LAST AND YOU DIDN'T HAVE MONEY TO GET MORE.: SOMETIMES TRUE

## 2024-05-17 ASSESSMENT — PATIENT HEALTH QUESTIONNAIRE - PHQ9
SUM OF ALL RESPONSES TO PHQ9 QUESTIONS 1 & 2: 0
2. FEELING DOWN, DEPRESSED OR HOPELESS: NOT AT ALL
1. LITTLE INTEREST OR PLEASURE IN DOING THINGS: NOT AT ALL

## 2024-05-17 ASSESSMENT — ENCOUNTER SYMPTOMS
ABDOMINAL PAIN: 0
ALLERGIC/IMMUNOLOGIC NEGATIVE: 0
VOMITING: 0
WEAKNESS: 0
EYES NEGATIVE: 0
SHORTNESS OF BREATH: 0
NAUSEA: 0
PALPITATIONS: 0
OCCASIONAL FEELINGS OF UNSTEADINESS: 0
HEMATURIA: 0
DIZZINESS: 0
FEVER: 0
NEUROLOGICAL NEGATIVE: 1
HEADACHES: 0
DYSURIA: 0
CONSTITUTIONAL NEGATIVE: 0
DEPRESSION: 0
FREQUENCY: 0
DIARRHEA: 0
CONSTIPATION: 0
ENDOCRINE NEGATIVE: 1
GASTROINTESTINAL NEGATIVE: 0
CARDIOVASCULAR NEGATIVE: 0
LOSS OF SENSATION IN FEET: 0
COUGH: 0
RESPIRATORY NEGATIVE: 0
HEMATOLOGIC/LYMPHATIC NEGATIVE: 1
CHILLS: 0
PSYCHIATRIC NEGATIVE: 1
MUSCULOSKELETAL NEGATIVE: 0

## 2024-05-17 ASSESSMENT — PAIN SCALES - GENERAL: PAINLEVEL: 0-NO PAIN

## 2024-05-17 NOTE — PROGRESS NOTES
Well Woman Visit    SUBJECTIVE  63 y.o.  female presents for well woman exam     OB/GYN History  OB History    Para Term  AB Living   0 0 0 0 0 0   SAB IAB Ectopic Multiple Live Births   0 0 0 0 0       Menstrual status: Postmenopausal  No LMP recorded. Patient is postmenopausal.  Menses: None  Abnormal bleeding: Yes - episode of spotting after masturbation  Discharge: No  Pelvic pain: No  Pelvic prolapse: No  Urinary/fecal incontinence or overactive bladder: No  Breast concerns: No    Vasomotor symptoms: No  Genitourinary symptoms: Yes - dysuria, spotting  Sleep concerns: No  Mood concerns: No  Hormone therapy: No    Social History     Substance and Sexual Activity   Sexual Activity Not on file     Sexually transmitted infections:no past history  History of abnormal pap: No  Sexual concerns: No    Other: Not sexually active     The following portions of the chart were reviewed this encounter and updated as appropriate:            Screenings  Social Determinants of Health     Tobacco Use: Low Risk  (2024)    Patient History     Smoking Tobacco Use: Never     Smokeless Tobacco Use: Never     Passive Exposure: Not on file   Alcohol Use: Not on file   Financial Resource Strain: Not on file   Food Insecurity: Food Insecurity Present (2024)    Hunger Vital Sign     Worried About Running Out of Food in the Last Year: Never true     Ran Out of Food in the Last Year: Sometimes true   Transportation Needs: Not on file   Physical Activity: Not on file   Stress: Not on file   Social Connections: Not on file   Intimate Partner Violence: Not on file   Depression: Not at risk (2024)    PHQ-2     PHQ-2 Score: 0   Housing Stability: Not on file   Utilities: Not on file   Digital Equity: Not on file   Health Literacy: Not on file         Hereditary Cancer Risk Assessment:   Family history of breast, ovarian, uterine, colon, pancreatic, and/or prostate cancer:  Family History   Problem Relation  "Name Age of Onset    Cervical cancer Mother      Hypertension Father      Pancreatic cancer Father      Coronary artery disease Maternal Grandmother      Diabetes Other uncle         Review of Systems  Review of Systems   Constitutional:  Negative for chills and fever.   Eyes:  Negative for visual disturbance.   Respiratory:  Negative for cough and shortness of breath.    Cardiovascular:  Negative for chest pain and palpitations.   Gastrointestinal:  Negative for abdominal pain, constipation, diarrhea, nausea and vomiting.   Genitourinary:  Positive for vaginal bleeding. Negative for dyspareunia, dysuria, frequency, hematuria, urgency and vaginal discharge.   Neurological:  Negative for dizziness, weakness and headaches.         OBJECTIVE  Vitals:    05/17/24 1321   BP: 143/80   Weight: 93 kg (205 lb)   Height: 1.651 m (5' 5\")     Body mass index is 34.11 kg/m².      Physical Exam  Constitutional:       General: She is not in acute distress.     Appearance: Normal appearance.   Genitourinary:      Vulva and rectum normal.      Right Labia: No lesions or skin changes.     Left Labia: No lesions or skin changes.     No vaginal discharge, tenderness, bleeding or cuff induration.      Moderate vaginal atrophy present.  Breasts:     Breasts are symmetrical.      Right: Normal.      Left: Normal.   HENT:      Head: Normocephalic and atraumatic.      Nose: Nose normal.      Mouth/Throat:      Mouth: Mucous membranes are moist.      Pharynx: Oropharynx is clear.   Eyes:      Extraocular Movements: Extraocular movements intact.      Conjunctiva/sclera: Conjunctivae normal.      Pupils: Pupils are equal, round, and reactive to light.   Cardiovascular:      Rate and Rhythm: Normal rate.      Pulses: Normal pulses.   Pulmonary:      Effort: Pulmonary effort is normal.   Abdominal:      General: Abdomen is flat. There is no distension.      Palpations: Abdomen is soft.      Tenderness: There is no abdominal tenderness. There is " no guarding or rebound.   Musculoskeletal:         General: Normal range of motion.   Neurological:      General: No focal deficit present.      Mental Status: She is alert and oriented to person, place, and time.   Skin:     General: Skin is warm and dry.   Psychiatric:         Mood and Affect: Mood normal.         Behavior: Behavior normal.   Vitals reviewed. Exam conducted with a chaperone present.           Last pap: approximate date 2010 and was normal  Last mammogram: approximate date 04/2024 and was normal  Last DEXA: not indicated   Last colonoscopy:  Needs PCP referred prior      Immunization History   Administered Date(s) Administered    Influenza, Unspecified 01/17/2019    Influenza, injectable, quadrivalent 10/18/2017    Influenza, seasonal, injectable, preservative free 10/09/2015, 01/17/2019    Nidia SARS-CoV-2 Vaccination 03/13/2021    Moderna SARS-CoV-2 Vaccination 10/26/2021         ASSESSMENT & PLAN  -Well woman screenings performed today  -Reviewed cervical, breast, and colon cancer screening recommendations  -Discussed menopause expectations  -Encouraged routine wellness exams with PCP Zaire Green MD     -Issues identified and addressed today:   Problem List Items Addressed This Visit          Ob-Gyn Problems    Vaginal atrophy - Primary    Overview     - Episode of bleeding with masturbation, none prior  - Exam unremarkable, well healed cuff without lesions  - Moderate atrophy on exam, will trial vaginal estrogen         Relevant Medications    estrogens, conjugated, (Premarin) vaginal cream     Follow up 1 year, sooner if needed    Ashely Muniz MD  Obstetrics & Gynecology  05/17/24

## 2024-05-23 DIAGNOSIS — G25.81 RESTLESS LEGS SYNDROME: ICD-10-CM

## 2024-05-23 DIAGNOSIS — E11.8 DIABETES MELLITUS WITH COMPLICATION (MULTI): ICD-10-CM

## 2024-05-24 RX ORDER — ROPINIROLE 2 MG/1
2 TABLET, FILM COATED ORAL NIGHTLY
Qty: 90 TABLET | Refills: 1 | Status: SHIPPED | OUTPATIENT
Start: 2024-05-24

## 2024-05-24 RX ORDER — INSULIN GLARGINE 100 [IU]/ML
INJECTION, SOLUTION SUBCUTANEOUS
Qty: 15 ML | Refills: 2 | Status: SHIPPED | OUTPATIENT
Start: 2024-05-24

## 2024-05-29 ENCOUNTER — TELEMEDICINE (OUTPATIENT)
Dept: PHARMACY | Facility: HOSPITAL | Age: 64
End: 2024-05-29
Payer: COMMERCIAL

## 2024-05-29 DIAGNOSIS — E11.8 DIABETES MELLITUS WITH COMPLICATION (MULTI): ICD-10-CM

## 2024-05-29 PROCEDURE — RXMED WILLOW AMBULATORY MEDICATION CHARGE

## 2024-05-29 RX ORDER — DULAGLUTIDE 1.5 MG/.5ML
1.5 INJECTION, SOLUTION SUBCUTANEOUS
Qty: 2 ML | Refills: 2 | Status: SHIPPED | OUTPATIENT
Start: 2024-06-02

## 2024-05-29 NOTE — PROGRESS NOTES
Patient is sent at the request of Renata Green for my opinion regarding Type 2 diabetes.  My final recommendations will be communicated back to the requesting provider by way of shared medical record.    Subjective     HPI    Past Medical History:  She has a past medical history of Acute sinusitis, unspecified (06/24/2013), CAD (coronary artery disease) (03/30/2023), Encounter for screening for infections with a predominantly sexual mode of transmission (07/09/2014), Migraine without aura, not intractable, without status migrainosus (06/24/2013), Other conditions influencing health status, Other enthesopathies, not elsewhere classified (05/16/2016), Personal history of colonic polyps (02/05/2015), Personal history of Methicillin resistant Staphylococcus aureus infection, Personal history of other diseases of the nervous system and sense organs (12/04/2014), Personal history of other diseases of the nervous system and sense organs, Personal history of other infectious and parasitic diseases (10/11/2016), Personal history of other medical treatment (05/16/2016), Personal history of urinary (tract) infections (06/25/2013), Positive LEANNE (antinuclear antibody) (03/30/2023), and Rotator cuff syndrome of right shoulder (03/30/2023).    Past Surgical History:  She has a past surgical history that includes Hysterectomy (03/25/2013); Carpal tunnel release (12/15/2017); Other surgical history (12/15/2017); Other surgical history (06/25/2013); and Excision / biopsy breast / nipple / duct (Left, 05/09/2014).    Social History:  She reports that she has never smoked. She has never used smokeless tobacco. She reports current alcohol use. She reports current drug use.    Family History:  Family History   Problem Relation Name Age of Onset    Cervical cancer Mother      Hypertension Father      Pancreatic cancer Father      Coronary artery disease Maternal Grandmother      Diabetes Other uncle         Allergies:  Amoxicillin-pot clavulanate, Lisinopril, Tetracycline, and Sulfa (sulfonamide antibiotics)    Current diet:  Patient has been working to cut back on sweets - less cookies and baked goods recently. Patient has less of an appetite since starting the trulicity.   Current exercise: gardening, walking, and yard work. Walking dogs at [place].    Current monitoring regimen:   Patient is using: continuous glucose monitor - Freestyle Aida 3  No readings available.     Any episodes of hypoglycemia? no  Adverse Effects: None    Objective     Last Recorded Vitals:  BP Readings from Last 6 Encounters:   05/17/24 143/80   04/02/24 147/84   07/27/23 120/78   04/27/23 125/82   01/26/23 130/80   07/14/22 130/80       Wt Readings from Last 6 Encounters:   05/17/24 93 kg (205 lb)   05/09/24 93 kg (205 lb)   04/02/24 95.8 kg (211 lb 3.2 oz)   07/27/23 92.3 kg (203 lb 6.4 oz)   04/27/23 94.3 kg (208 lb)   01/26/23 97.3 kg (214 lb 8 oz)       Diabetes Pharmacotherapy:    Insulin injections: Insulin dosage review with Mindy suggested noncompliance some of the time.    Pre-breakfast N/A   Pre-lunch N/A   Pre-dinner N/A   Bedtime Lantus 46 units   Insulin injections are given by patient.   Rotation of sites for injection: abdominal wall      Trulicity 1.5 mg subcutaneously once weekly (missed one 1.5 mg dose and took 0.75 mg dose this past Monday)  Glimepiride 4 mg BID (has been consistent with AM doses x1 week)  Metformin 1000 mg BID (has been consistent with AM doses x1 week)     Adverse Effects: None     Primary/Secondary Prevention:   - Statin? Yes  - ACE-I/ARB? No  - Aspirin? No     Pertinent PMH Review:  - PMH of Pancreatitis: No  - PMH of Retinopathy: No  - PMH of Urinary Tract Infections: Yes  - PMH of MTC: No  - PMH of ASCVD: Yes  - PMH of CKD: No  - PMH of HF: No   - PMH of Obesity: Yes    Lab Review  Lab Results   Component Value Date    BILITOT 0.5 09/19/2023    CALCIUM 9.6 09/19/2023    CO2 26  "09/19/2023     09/19/2023    CREATININE 0.78 09/19/2023    GLUCOSE 233 (H) 09/19/2023    ALKPHOS 104 09/19/2023    K 4.3 09/19/2023    PROT 7.9 09/19/2023    PROT 7.9 09/19/2023    PROT 7.9 09/19/2023     09/19/2023    AST 20 09/19/2023    ALT 23 09/19/2023    BUN 11 09/19/2023    ANIONGAP 14 09/19/2023    MG 1.84 04/29/2023    ALBUMIN 4.4 09/19/2023    GFRF 85 09/19/2023     Lab Results   Component Value Date    TRIG 200 (H) 04/27/2023    CHOL 230 (H) 04/27/2023    HDL 39.8 (A) 04/27/2023     Lab Results   Component Value Date    HGBA1C 11.6 (A) 04/02/2024    HGBA1C 8.8 (A) 09/19/2023    HGBA1C 8.3 (A) 07/27/2023     No components found for: \"UACR\"  The 10-year ASCVD risk score (Kristin DEVINE, et al., 2019) is: 17.8%    Values used to calculate the score:      Age: 63 years      Sex: Female      Is Non- : No      Diabetic: Yes      Tobacco smoker: No      Systolic Blood Pressure: 143 mmHg      Is BP treated: Yes      HDL Cholesterol: 39.8 mg/dL      Total Cholesterol: 230 mg/dL      Assessment/Plan   Problem List Items Addressed This Visit       Diabetes mellitus with complication (Multi)    Relevant Medications    dulaglutide (Trulicity) 1.5 mg/0.5 mL pen injector injection (Start on 6/2/2024)       Patients diabetes is above goal < 7% with most recent A1c of 11.6% on 4/2/24. A1c on 9/19/23 was 8.3%. Set up sensor with new reader about 4 days ago. Trulicity on Wednesdays - has missed two doses (last week and this week). Missed two evening doses of lantus in the past two weeks. More often missing morning doses of metformin and glimepiride. Estimates taking both medications BID maybe 3 of the last 14 days. Planning to set morning alarm for AM medications. Patient used a similar process with Trulicity which helped with adherence. Due to backorder issues, plan to lower trulicity dose back to 1.5 mg once weekly. Will continue to focus on improving adherence for oral medications. Plan to " review BG readings at next visit and adjust insulin regimen as needed.   Change   Trulicity 1.5 mg once weekly   Due to national shortage/backorder Trulicity 3 mg is unavailable   Patient has been off trulicity x2 weeks. Plan to lower dose back to 1.5 mg once weekly. Will evaluate again in 2 weeks and determine next steps at that time.   Continue:   Trulicity 1.5 mg once weekly   Lantus to 46 units at bedtime    Glimepiride 4 mg BID   Metformin 1000 mg BID  Compliance at present is estimated to be fair. Efforts to improve compliance (if necessary) will be directed at  setting alarm to improve adherence to AM medications .  Education Provided to Patient:   Counseled patient on Trulicity MOA, expectations, side effects, duration of therapy, administration, and monitoring parameters.  Provided detailed dosing and administration counseling to ensure proper technique.   Reviewed Trulicity titration schedule, starting with 0.75 mg once weekly to 1.5 mg, 3 mg, and if tolerated 4.5 mg.  Counseled patient on the benefits of GLP-1ra, such as cardiovascular risk reduction, glycemic control, and weight loss potential.  Reviewed storage requirements of Trulicity when not in use, and when to administer the medication if a dose is missed.  Advised patient that they may experience improved satiety after meals and portion sizes of meals may be reduced as doses of Trulicity increase.  Follow-up: 2 weeks - 6/12/24 @ 11 am   PCP Follow-Up: 7/3/24    Chichi Moreno, PharmD    Continue all meds under the continuation of care with the referring provider and clinical pharmacy team.

## 2024-05-31 ENCOUNTER — PHARMACY VISIT (OUTPATIENT)
Dept: PHARMACY | Facility: CLINIC | Age: 64
End: 2024-05-31
Payer: MEDICAID

## 2024-06-12 ENCOUNTER — APPOINTMENT (OUTPATIENT)
Dept: PHARMACY | Facility: HOSPITAL | Age: 64
End: 2024-06-12
Payer: COMMERCIAL

## 2024-06-12 DIAGNOSIS — E11.8 DIABETES MELLITUS WITH COMPLICATION (MULTI): ICD-10-CM

## 2024-06-12 NOTE — PROGRESS NOTES
Patient is sent at the request of Renata Green for my opinion regarding Type 2 diabetes.  My final recommendations will be communicated back to the requesting provider by way of shared medical record.    Subjective     HPI    Past Medical History:  She has a past medical history of Acute sinusitis, unspecified (06/24/2013), CAD (coronary artery disease) (03/30/2023), Encounter for screening for infections with a predominantly sexual mode of transmission (07/09/2014), Migraine without aura, not intractable, without status migrainosus (06/24/2013), Other conditions influencing health status, Other enthesopathies, not elsewhere classified (05/16/2016), Personal history of colonic polyps (02/05/2015), Personal history of Methicillin resistant Staphylococcus aureus infection, Personal history of other diseases of the nervous system and sense organs (12/04/2014), Personal history of other diseases of the nervous system and sense organs, Personal history of other infectious and parasitic diseases (10/11/2016), Personal history of other medical treatment (05/16/2016), Personal history of urinary (tract) infections (06/25/2013), Positive LEANNE (antinuclear antibody) (03/30/2023), and Rotator cuff syndrome of right shoulder (03/30/2023).    Past Surgical History:  She has a past surgical history that includes Hysterectomy (03/25/2013); Carpal tunnel release (12/15/2017); Other surgical history (12/15/2017); Other surgical history (06/25/2013); and Excision / biopsy breast / nipple / duct (Left, 05/09/2014).    Social History:  She reports that she has never smoked. She has never used smokeless tobacco. She reports current alcohol use. She reports current drug use.    Family History:  Family History   Problem Relation Name Age of Onset    Cervical cancer Mother      Hypertension Father      Pancreatic cancer Father      Coronary artery disease Maternal Grandmother      Diabetes Other uncle         Allergies:  Amoxicillin-pot clavulanate, Lisinopril, Tetracycline, and Sulfa (sulfonamide antibiotics)      Objective     Last Recorded Vitals:  BP Readings from Last 6 Encounters:   05/17/24 143/80   04/02/24 147/84   07/27/23 120/78   04/27/23 125/82   01/26/23 130/80   07/14/22 130/80       Wt Readings from Last 6 Encounters:   05/17/24 93 kg (205 lb)   05/09/24 93 kg (205 lb)   04/02/24 95.8 kg (211 lb 3.2 oz)   07/27/23 92.3 kg (203 lb 6.4 oz)   04/27/23 94.3 kg (208 lb)   01/26/23 97.3 kg (214 lb 8 oz)       Diabetes Pharmacotherapy:    Insulin injections: Insulin dosage review with Mindy suggested noncompliance some of the time.    Pre-breakfast N/A   Pre-lunch N/A   Pre-dinner N/A   Bedtime Lantus 46 units   Insulin injections are given by patient.   Rotation of sites for injection: abdominal wall      Trulicity 1.5 mg subcutaneously once weekly   Glimepiride 4 mg BID   Metformin 1000 mg BID     Adverse Effects: None     Primary/Secondary Prevention:   - Statin? Yes  - ACE-I/ARB? No  - Aspirin? No     Pertinent PMH Review:  - PMH of Pancreatitis: No  - PMH of Retinopathy: No  - PMH of Urinary Tract Infections: Yes  - PMH of MTC: No  - PMH of ASCVD: Yes  - PMH of CKD: No  - PMH of HF: No   - PMH of Obesity: Yes    Lab Review  Lab Results   Component Value Date    BILITOT 0.5 09/19/2023    CALCIUM 9.6 09/19/2023    CO2 26 09/19/2023     09/19/2023    CREATININE 0.78 09/19/2023    GLUCOSE 233 (H) 09/19/2023    ALKPHOS 104 09/19/2023    K 4.3 09/19/2023    PROT 7.9 09/19/2023    PROT 7.9 09/19/2023    PROT 7.9 09/19/2023     09/19/2023    AST 20 09/19/2023    ALT 23 09/19/2023    BUN 11 09/19/2023    ANIONGAP 14 09/19/2023    MG 1.84 04/29/2023    ALBUMIN 4.4 09/19/2023    GFRF 85 09/19/2023     Lab Results   Component Value Date    TRIG 200 (H) 04/27/2023    CHOL 230 (H) 04/27/2023    HDL 39.8 (A) 04/27/2023     Lab Results   Component Value Date    HGBA1C 11.6 (A) 04/02/2024    HGBA1C 8.8 (A)  "09/19/2023    HGBA1C 8.3 (A) 07/27/2023     No components found for: \"UACR\"  The 10-year ASCVD risk score (Kristin DEVINE, et al., 2019) is: 17.8%    Values used to calculate the score:      Age: 63 years      Sex: Female      Is Non- : No      Diabetic: Yes      Tobacco smoker: No      Systolic Blood Pressure: 143 mmHg      Is BP treated: Yes      HDL Cholesterol: 39.8 mg/dL      Total Cholesterol: 230 mg/dL      Assessment/Plan   Problem List Items Addressed This Visit       Diabetes mellitus with complication (Multi)       Patient reports that she has established with new PCP at [facility] who is taking over diabetes care. Advised patient that should she return to  for her diabetes, a new consult would need to be placed to resume care with pharmacy clinic.     Chichi Moreno, PharmD    Continue all meds under the continuation of care with the referring provider and clinical pharmacy team.    "

## 2024-07-03 ENCOUNTER — APPOINTMENT (OUTPATIENT)
Dept: PRIMARY CARE | Facility: CLINIC | Age: 64
End: 2024-07-03
Payer: COMMERCIAL

## 2024-10-07 ENCOUNTER — LAB (OUTPATIENT)
Dept: LAB | Facility: LAB | Age: 64
End: 2024-10-07
Payer: COMMERCIAL

## 2024-10-07 DIAGNOSIS — E53.8 B12 DEFICIENCY: Primary | ICD-10-CM

## 2024-10-07 PROCEDURE — 36415 COLL VENOUS BLD VENIPUNCTURE: CPT

## 2024-10-07 PROCEDURE — 82607 VITAMIN B-12: CPT

## 2024-10-08 LAB — VIT B12 SERPL-MCNC: 859 PG/ML (ref 211–911)

## 2024-10-14 NOTE — PROGRESS NOTES
Subjective   Patient ID: Mindy Dominguez is a 64 y.o. female who presents for Establish Care (Restless legs- takes gabapentin and ropinerole; DM2 out of control, needs pharmacy referral ).    She lives in [facility]. She is friends with Shu and Kris. Cheler from [facility] they stopped accepting her medicaid.    Diabetes: She is current on extended release metformin bid, lantus 46u QHS, glimepiride bid, trulicity. She often misses at least one dose of diabetic pills. She has the freestyle michael but hasn't been using it but found it helpful. She previously worked with a diabetic pharmacist through  and found that very helpful. She has been off of her trulicity and morning medications all summer. Didn't have any side effects, she is planning to restart soon. She is following with Dr. Allison but he told her recently that she does not have glaucoma.     HTN: She is on amlodipine, no SE's.     Depression and anxiety, panic attacks: Not seeing psych, she does follow with a counsellor. She has had some workup for chronic faitgue. She did notice problems with mental health off the cymbalta. Anxiety and depression were worse than normal. She is on wellbutrin for the last 9 months and she feels it is helping with the depression symptoms. She is using the klonopin prn rarely for stressful situations and insomnia.     Peripheral neuropathy, RLS: 2/2 DM. On cymbalta and gabapentin. She takes ropinirole as well. She follows with Dr. Leary. Her RLS symptoms are actually full body.     Hypothyroidism: On replacement.    HLD: On atorvastatin and gemfibrozil. No cardiac history. She has had calcium score about 2 years ago that was clear.     Migraines: Doing ok with prn sumatriptan. Hasn't had any in the last few years. Triggered by exertion and high temperatures.     Atrophic vaginitis: She has premarin cream from gyn (Dr. Morrison) but hasn't used it. No recent episodes of bleeding.     MIKAYLA: She started CPAP 15y ago.  "Within the last 4 years she was extremely compliant. She has lost about 90lb in this same time period and she believes the problem is she needs her pressure changed. Machine is through [facility].     She had a large surgery on right arm in , the olecranon surgery failed and she deals with chronic right pinky finger pain (Dr. Mays).     Review of systems completed and unremarkable other than what is documented in HPI.     Social history: never smoker, she drinks 2 to 4 drinks per year, she is on SSI for psychiatric diagnoses  Medical history: DM, anxiety and depression, HLD, Hypothyroidism  Medications: amlodipine, atrovastatin, wellbutrin, klonopin,, trulicity, cymbalta, premarin cream, gabapentin, gemfibrozil, glimepiride, lantus, levothyroxine, metformin, ropinirole, sumatriptan  SurgHx: hysterectomy in , for menorrhagia, ovarian cyst, uterine polyp and da anthony, surgery for carpal tunnel, ulna, and Dequervains in 2010 as well, abdominal surgery for mrsa  Fhx: dad  of pancreatic cancer, mom had Alzheimers dementia, dad and brother had HT, mom also had low thyroid and cervical cancer,   Allergies: augmentin, lisinopril, tetracycline, sulfa    Objective   /89 (BP Location: Left arm, Patient Position: Sitting, BP Cuff Size: Adult)   Pulse 87   Ht 1.651 m (5' 5\")   Wt 95.3 kg (210 lb)   BMI 34.95 kg/m²     Gen: No acute distress, alert and oriented x3, pleasant   HEENT: moist mucous membranes, b/l external auditory canals are clear of debris, TMs within normal limits, no oropharyngeal lesions, eomi, perrla   Neck: thyroid within normal limits, no lymphadenopathy   CV: RRR, normal S1/S2, no murmur   Resp: Clear to auscultation bilaterally, no wheezes or rhonchi appreciated  Abd: soft, nontender, non-distended, no guarding/rigidity, bowel sounds present  Extr: no edema, no calf tenderness  Derm: Skin is warm and dry, no rashes appreciated  Psych: mood is good, affect is congruent, good hygiene, normal " speech and eye contact  Neuro: cranial nerves grossly intact, normal gait    Assessment/Plan   #Diabetes mellitus:  On metformin xr, glimepiride, lantus 46u  Restarting trulicity  Pharmacy referral placed    #HTN:   Reasonable control on amlodipine    #Depression and anxiety  #Panic attacks:  #Insomnia:  Following with counsellor only  Doing well on wellbutrin and cymbalta  Has klonopin prn for insomnia  CSA signed, OARRS reviewed    #Peripheral neuropathy  #RLS:   2/2 DM  On cymbalta, gabapentin, and ropinirole  Following with podiatry for care    #Hypothyroidism:   Controlled on replacement    #HLD:   Controlled on atorvastatin and gemfibrozil    #Migraines:   Resolved  Has sumatriptan for rescue    #Atrophic vaginitis:   She has premarin cream from gyn (Dr. Morrison)  No recent episodes of bleeding     #MIKAYLA:   She started CPAP 15y ago  Ordering new sleep study    HCM  Mammogram May negative  Flu vaccine today  UTD for COVID vaccines  She is well overdue for C-scope, we will set up in Summer, she has seen Ernie in the distant

## 2024-10-15 ENCOUNTER — APPOINTMENT (OUTPATIENT)
Dept: PRIMARY CARE | Facility: CLINIC | Age: 64
End: 2024-10-15
Payer: COMMERCIAL

## 2024-10-15 VITALS
BODY MASS INDEX: 34.99 KG/M2 | HEART RATE: 87 BPM | WEIGHT: 210 LBS | SYSTOLIC BLOOD PRESSURE: 145 MMHG | HEIGHT: 65 IN | DIASTOLIC BLOOD PRESSURE: 89 MMHG

## 2024-10-15 DIAGNOSIS — Z23 ENCOUNTER FOR IMMUNIZATION: ICD-10-CM

## 2024-10-15 DIAGNOSIS — E11.8 DIABETES MELLITUS WITH COMPLICATION (MULTI): ICD-10-CM

## 2024-10-15 DIAGNOSIS — G47.33 OSA (OBSTRUCTIVE SLEEP APNEA): Primary | ICD-10-CM

## 2024-10-15 PROCEDURE — 3077F SYST BP >= 140 MM HG: CPT | Performed by: FAMILY MEDICINE

## 2024-10-15 PROCEDURE — 3079F DIAST BP 80-89 MM HG: CPT | Performed by: FAMILY MEDICINE

## 2024-10-15 PROCEDURE — 3008F BODY MASS INDEX DOCD: CPT | Performed by: FAMILY MEDICINE

## 2024-10-15 PROCEDURE — 90656 IIV3 VACC NO PRSV 0.5 ML IM: CPT | Performed by: FAMILY MEDICINE

## 2024-10-15 PROCEDURE — 90471 IMMUNIZATION ADMIN: CPT | Performed by: FAMILY MEDICINE

## 2024-10-15 PROCEDURE — 2028F FOOT EXAM PERFORMED: CPT | Performed by: FAMILY MEDICINE

## 2024-10-15 PROCEDURE — 99214 OFFICE O/P EST MOD 30 MIN: CPT | Performed by: FAMILY MEDICINE

## 2024-10-23 ENCOUNTER — TELEMEDICINE (OUTPATIENT)
Dept: PHARMACY | Facility: HOSPITAL | Age: 64
End: 2024-10-23
Payer: COMMERCIAL

## 2024-10-23 DIAGNOSIS — E11.8 DIABETES MELLITUS WITH COMPLICATION (MULTI): ICD-10-CM

## 2024-10-23 RX ORDER — INSULIN GLARGINE 100 [IU]/ML
INJECTION, SOLUTION SUBCUTANEOUS
Qty: 15 ML | Refills: 2 | Status: SHIPPED | OUTPATIENT
Start: 2024-10-23

## 2024-10-23 RX ORDER — BLOOD-GLUCOSE SENSOR
EACH MISCELLANEOUS
Qty: 2 EACH | Refills: 2 | Status: SHIPPED | OUTPATIENT
Start: 2024-10-23

## 2024-10-23 RX ORDER — TIRZEPATIDE 2.5 MG/.5ML
2.5 INJECTION, SOLUTION SUBCUTANEOUS WEEKLY
Qty: 2 ML | Refills: 2 | Status: SHIPPED | OUTPATIENT
Start: 2024-10-23

## 2024-10-23 NOTE — PROGRESS NOTES
Clinical Pharmacy Appointment    Patient ID: Mindy Dominguez is a 64 y.o. female who presents for Follow Up appointment.     Referring Provider: Tammie Aldridge DO  PCP: Tammie Aldridge DO   Last visit with PCP: 10/15/24   Next visit with PCP: 1/21/25    Subjective     DIABETES MELLITUS TYPE II:    Known diabetic complications: neuropathy.  Does patient follow with Endocrinology: No  Last optometry exam: 10/11/24  Most recent visit in Podiatry: Unknown -- patient denies sores or cuts on feet today      Current diabetic medications include:  Lantus 46 units at bedtime   Glimepiride 4 mg twice daily (only taking at bedtime)  Metformin XR 1000 mg BID (only taking at bedtime)  Trulicity 1.5 mg once weekly (not taking)    Clarifications to above regimen: Patient has been off Trulicity for several months   Adverse Effects: None    Glucose Readings:  Glucometer/CGM Type: Freestyle Aida 3  Was checking regularly over the summer but has not placed sensor in a few months.     Current home BG readings: No readings available. Patient not current using sensors.      Any episodes of hypoglycemia? No, none reported .  Did patient treat episode of hypoglycemia appropriately? N/A    Primary/Secondary Prevention:   - Statin? Yes  - ACE-I/ARB? No  - Aspirin? No     Pertinent PMH Review:  - PMH of Pancreatitis: No  - PMH of Retinopathy: No  - PMH of Urinary Tract Infections: Yes  - PMH of MTC: No  - PMH of ASCVD: Yes  - PMH of CKD: No  - PMH of HF: No   - PMH of Obesity: Yes     Medication Reconciliation:  No changes    Drug Interactions  No relevant drug interactions were noted.    Medication System Management  Patient's preferred pharmacy: [facility]  Adherence/Organization: Does not wake up early enough to take morning medications. Has struggled with adherence with twice daily medications.  Affordability/Accessibility: No issues       Objective   Allergies   Allergen Reactions    Amoxicillin-Pot Clavulanate Unknown     Lisinopril Unknown    Tetracycline Unknown     tongue swells    Sulfa (Sulfonamide Antibiotics) Hives and Rash     Social History     Social History Narrative    Not on file      Medication Review  Current Outpatient Medications   Medication Instructions    amLODIPine (NORVASC) 2.5 mg, oral, Daily    atorvastatin (LIPITOR) 80 mg, oral, Daily    blood-glucose sensor (FreeStyle Aida 3 Sensor) device USE AS DIRECTED TO CHECK YOUR BLOOD AT LEAST 5 TIMES DAILY. CHANGE SENSOR EVERY 14 DAYS.    buPROPion (WELLBUTRIN) 100 mg, oral, Nightly    cholecalciferol (VITAMIN D-3) 2,000 Units, oral, Daily    clonazePAM (KlonoPIN) 0.5 mg tablet TAKE 1 TABLET AT BEDTIME AS NEEDED FOR ANXIETY    DULoxetine (Cymbalta) 60 mg DR capsule TAKE 1 CAPSULE BY MOUTH EVERY DAY    estrogens (conjugated) (PREMARIN) 0.5 g, vaginal, Daily, Take 1 gram nightly for 1 week and then take 1 gram on Monday, Wednesday, and Friday    FreeStyle Aida 3 [place] misc Use as instructed to check blood glucose at least 5 times daily.    FreeStyle Lite Strips strip 4 times daily    gabapentin enacarbil (Horizant) 600 mg tablet extended release ER tablet TAKE 1 TABLET BY MOUTH EVERY DAY WITH FOOD AT ABOUT 5PM    gemfibrozil (LOPID) 600 mg, oral, 2 times daily    glimepiride (Amaryl) 4 mg tablet 1 tablet, take 1 tablet by mouth twice a day with food    insulin glargine (Lantus Solostar U-100 Insulin) 100 unit/mL (3 mL) pen INJECT 42 UNITS SUBCUTANEOUSLY AT BEDTIME AS DIRECTED    levothyroxine (Synthroid, Levoxyl) 150 mcg tablet TAKE 1 TABLEY BY MOUTH ONCE DAILY, EXCEPT SATURDAYS & SUNDAYS TAKE 1/2 TAB    metFORMIN (Glucophage) 1,000 mg tablet TAKE 1 TABLET BY MOUTH TWICE A DAY WITH MEALS    rOPINIRole (REQUIP) 2 mg, oral, Nightly    SUMAtriptan (Imitrex) 50 mg tablet oral, TAKE 1 TABLET FOR MIGRAINE RELIEF. MAY REPEAT EVERY 2 HOURS. DO NOT TAKE MORE THAN 3 TABLETS IN 24 HOUR PERIOD    Trulicity 1.5 mg, subcutaneous, Once Weekly    UltiCare Pen Needle 32 gauge x  "5/32\" needle USE EVERY DAY AS DIRECTED AT NIGHT      Vitals  BP Readings from Last 2 Encounters:   10/15/24 145/89   05/17/24 143/80     BMI Readings from Last 1 Encounters:   10/15/24 34.95 kg/m²      Labs  A1C  Lab Results   Component Value Date    HGBA1C 12.4 (H) 09/10/2024    HGBA1C 11.6 (A) 04/02/2024    HGBA1C 8.8 (A) 09/19/2023     BMP  Lab Results   Component Value Date    CALCIUM 9.6 09/19/2023     09/19/2023    K 4.3 09/19/2023    CO2 26 09/19/2023     09/19/2023    BUN 11 09/19/2023    CREATININE 0.78 09/19/2023     LFTs  Lab Results   Component Value Date    ALT 23 09/19/2023    AST 20 09/19/2023    ALKPHOS 104 09/19/2023    BILITOT 0.5 09/19/2023     FLP  Lab Results   Component Value Date    TRIG 200 (H) 04/27/2023    CHOL 230 (H) 04/27/2023    LDLF 150 (H) 04/27/2023    HDL 39.8 (A) 04/27/2023     Urine Microalbumin  Lab Results   Component Value Date    MICROALBCREA 30 - 300 (A) 04/02/2024     Weight Management  Wt Readings from Last 3 Encounters:   10/15/24 95.3 kg (210 lb)   05/17/24 93 kg (205 lb)   05/09/24 93 kg (205 lb)      There is no height or weight on file to calculate BMI.     Assessment/Plan   Problem List Items Addressed This Visit       Diabetes mellitus with complication (Multi)     Patient's goal A1c is < 7.0%. Is pt at goal? No, most recent A1c on 9/10/24 of 12.4%. Prior A1c was 11.6% on 4/2/24. Next A1c will be due after 12/10/24.     Patient was previously followed by pharmacy but visit were discontinued when patient established with PCP at outside hospital. Patient again has provider within  and pharmacy is able to resume follow up.       Rationale for plan: Patient's SMBGs were unavailable at time of visit, although patient reports readings consistently in 200-300's. Most recent A1c one month ago was increased from previous reading. Patient reports that she has not taken Trulicity since previous pharmacy visit in June. Due to lapse in doses, recommended " re-initiation at lowest dose. Patient also reported weight gain since previous visit and verbalized desire to return to baseline weight. Given A1c and weight loss goals, discussed transition to Mounjaro rather than initiation of Trulicity. Patient is agreeable and [facility] [place] Patient declined filling with  Pharmacy. Also recommended that patient increase insulin glargine for the time being - recommended increase to 50 units at bedtime. With medication changes, advised on the importance of regular BG monitoring. Patient is agreeable to restarting Freestyle Aida 3 sensors for continuous BG checking. Patient has consisently reported challenges taking twice daily medications. Reports that morning doses are missed on most days. Will consider transition to all once daily medications in the future. Given high BG levels at this time, recommended the patient continue glimepiride 4 mg BID and meformin XR 1000 mg BID.     Medication Changes:  CONTINUE:  Metformin XR 1000 mg BID   Glimepiride 4 mg BID   STOP  Trulicity 1.5 mg once weekly   START  Mounjaro 2.5 mg once weekly   INCREASE  Lantus 50 units at bedtime    Future Considerations:  SGLT2 therapy may be appropriate. Once daily medication would be beneficial. Patient has UTI history, will need to evaluate further.     Monitoring and Education:  Counseled patient on Mounjaro MOA, expectations, side effects, duration of therapy, administration, and monitoring parameters.  Provided detailed dosing and administration counseling to ensure proper technique.   Reviewed Mounjaro titration schedule, starting with 2.5 mg once weekly to a goal of 15 mg once weekly if tolerated  Counseled patient on the benefits of GLP-1ra glycemic control and weight loss  Reviewed storage requirements of Mounjaro when not in use, and when to administer the medication if a dose is missed.  Advised patient that they may experience improved satiety  after meals and portion sizes of meals may be reduced as doses of Mounjaro increase.    Clinical Pharmacist follow-up: 10/30/24 @ 11 am, Telehealth visit    Continue all meds under the continuation of care with the referring provider and clinical pharmacy team.    Thank you,  Chichi Moreno, PharmD  Clinical Pharmacist  [phone]    Verbal consent to manage patient's drug therapy was obtained from the patient. They were informed they may decline to participate or withdraw from participation in pharmacy services at any time.

## 2024-10-30 ENCOUNTER — APPOINTMENT (OUTPATIENT)
Dept: PHARMACY | Facility: HOSPITAL | Age: 64
End: 2024-10-30
Payer: COMMERCIAL

## 2024-10-30 DIAGNOSIS — E11.8 DIABETES MELLITUS WITH COMPLICATION (MULTI): ICD-10-CM

## 2024-11-06 ENCOUNTER — APPOINTMENT (OUTPATIENT)
Dept: PHARMACY | Facility: HOSPITAL | Age: 64
End: 2024-11-06
Payer: COMMERCIAL

## 2024-11-06 DIAGNOSIS — E11.8 DIABETES MELLITUS WITH COMPLICATION (MULTI): Primary | ICD-10-CM

## 2024-11-06 PROCEDURE — RXMED WILLOW AMBULATORY MEDICATION CHARGE

## 2024-11-06 RX ORDER — TIRZEPATIDE 2.5 MG/.5ML
2.5 INJECTION, SOLUTION SUBCUTANEOUS WEEKLY
Qty: 2 ML | Refills: 2 | Status: SHIPPED | OUTPATIENT
Start: 2024-11-06

## 2024-11-06 NOTE — PROGRESS NOTES
Clinical Pharmacy Appointment    Patient ID: Mindy Dominguez is a 64 y.o. female who presents for Follow Up appointment.     Referring Provider: Tammie Aldridge DO  PCP: Tammie Aldridge DO   Last visit with PCP: 10/15/24   Next visit with PCP: 1/21/25    Subjective     DIABETES MELLITUS TYPE II:    Known diabetic complications: neuropathy.  Does patient follow with Endocrinology: No  Last optometry exam: 10/11/24  Most recent visit in Podiatry: unknown-- patient denies sores or cuts on feet today      Current diabetic medications include:  Lantus 50 units at bedtime   Glimepiride 4 mg twice daily (only taking at bedtime)  Metformin XR 1000 mg BID (only taking at bedtime)  Mounjaro 2.5 mg once weekly (not yet started - waiting on PA)    Adverse Effects: None    Glucose Readings:  Glucometer/CGM Type: Freestyle Aida 3  Started sensor 5 days ago    Current home BG readings: all readings >200 mg/dL     Any episodes of hypoglycemia? No, no readings less than 70 mg/dL .  Did patient treat episode of hypoglycemia appropriately? N/A    Lifestyle:  Diet: 1-2 meals/day. Wakes early and stays up late. Dinner 6-7 pm and salad at midnight. Trying to incorporate small meal or snack for breakfast. Reports more cookies recently.     Primary/Secondary Prevention:   - Statin? Yes - Atorvastatin 80 mg daily   - ACE-I/ARB? No  - Aspirin? No     Pertinent PMH Review:  - PMH of Pancreatitis: No  - PMH of Retinopathy: No  - PMH of Urinary Tract Infections: Yes (remote history per patient)  - PMH of MTC: No  - PMH of ASCVD: Yes  - PMH of CKD: No  - PMH of HF: No   - PMH of Obesity: Yes    Medication Reconciliation:  No changes     Drug Interactions  No relevant drug interactions were noted.     Medication System Management  Patient's preferred pharmacy: [facility]  Adherence/Organization: Does not wake up early enough to take morning medications. Has struggled with adherence with twice daily  medications.  Affordability/Accessibility: No issues     Objective   Allergies   Allergen Reactions    Amoxicillin-Pot Clavulanate Unknown    Lisinopril Unknown    Tetracycline Unknown     tongue swells    Sulfa (Sulfonamide Antibiotics) Hives and Rash     Social History     Social History Narrative    Not on file      Medication Review  Current Outpatient Medications   Medication Instructions    amLODIPine (NORVASC) 2.5 mg, oral, Daily    atorvastatin (LIPITOR) 80 mg, oral, Daily    blood-glucose sensor (FreeStyle Aida 3 Sensor) device Use as directed to check your blood at least 5 times daily. Change sensor every 14 days.    buPROPion (WELLBUTRIN) 100 mg, oral, Nightly    cholecalciferol (VITAMIN D-3) 2,000 Units, oral, Daily    clonazePAM (KlonoPIN) 0.5 mg tablet TAKE 1 TABLET AT BEDTIME AS NEEDED FOR ANXIETY    DULoxetine (Cymbalta) 60 mg DR capsule TAKE 1 CAPSULE BY MOUTH EVERY DAY    estrogens (conjugated) (PREMARIN) 0.5 g, vaginal, Daily, Take 1 gram nightly for 1 week and then take 1 gram on Monday, Wednesday, and Friday    FreeStyle Aida 3 [place] misc Use as instructed to check blood glucose at least 5 times daily.    FreeStyle Lite Strips strip 4 times daily    gabapentin enacarbil (Horizant) 600 mg tablet extended release ER tablet TAKE 1 TABLET BY MOUTH EVERY DAY WITH FOOD AT ABOUT 5PM    gemfibrozil (LOPID) 600 mg, oral, 2 times daily    glimepiride (Amaryl) 4 mg tablet 1 tablet, take 1 tablet by mouth twice a day with food    insulin glargine (Lantus Solostar U-100 Insulin) 100 unit/mL (3 mL) pen INJECT 50 UNITS SUBCUTANEOUSLY AT BEDTIME AS DIRECTED    levothyroxine (Synthroid, Levoxyl) 150 mcg tablet TAKE 1 TABLEY BY MOUTH ONCE DAILY, EXCEPT SATURDAYS & SUNDAYS TAKE 1/2 TAB    metFORMIN (Glucophage) 1,000 mg tablet TAKE 1 TABLET BY MOUTH TWICE A DAY WITH MEALS    Mounjaro 2.5 mg, subcutaneous, Weekly    rOPINIRole (REQUIP) 2 mg, oral, Nightly    SUMAtriptan (Imitrex) 50 mg tablet oral, TAKE 1 TABLET  "FOR MIGRAINE RELIEF. MAY REPEAT EVERY 2 HOURS. DO NOT TAKE MORE THAN 3 TABLETS IN 24 HOUR PERIOD    UltiCare Pen Needle 32 gauge x 5/32\" needle USE EVERY DAY AS DIRECTED AT NIGHT      Vitals  BP Readings from Last 2 Encounters:   10/15/24 145/89   05/17/24 143/80     BMI Readings from Last 1 Encounters:   10/15/24 34.95 kg/m²      Labs  A1C  Lab Results   Component Value Date    HGBA1C 12.4 (H) 09/10/2024    HGBA1C 11.6 (A) 04/02/2024    HGBA1C 8.8 (A) 09/19/2023     BMP  Lab Results   Component Value Date    CALCIUM 9.6 09/19/2023     09/19/2023    K 4.3 09/19/2023    CO2 26 09/19/2023     09/19/2023    BUN 11 09/19/2023    CREATININE 0.78 09/19/2023     LFTs  Lab Results   Component Value Date    ALT 23 09/19/2023    AST 20 09/19/2023    ALKPHOS 104 09/19/2023    BILITOT 0.5 09/19/2023     FLP  Lab Results   Component Value Date    TRIG 200 (H) 04/27/2023    CHOL 230 (H) 04/27/2023    LDLF 150 (H) 04/27/2023    HDL 39.8 (A) 04/27/2023     Urine Microalbumin  Lab Results   Component Value Date    MICROALBCREA 30 - 300 (A) 04/02/2024     Weight Management  Wt Readings from Last 3 Encounters:   10/15/24 95.3 kg (210 lb)   05/17/24 93 kg (205 lb)   05/09/24 93 kg (205 lb)      There is no height or weight on file to calculate BMI.     Assessment/Plan   Problem List Items Addressed This Visit       Diabetes mellitus with complication (Multi)     Patient's goal A1c is < 7.0%. Is pt at goal? No, most recent A1c on 9/10/24 of 12.4%. Prior A1c was 11.6% on 4/2/24. Next A1c will be due after 12/10/24.      Rationale for plan: Patient's SMBGs are above goal. At previous visit, discussed transition from Trulicity to Mounjaro. Patient reports unknown status of prior authorization. Multiple PA's appear to be active. Plan to process prescription through  Pharmacy -  PA team to assist with authorization. If unable to obtain approval for prior authorization, plan to restart Trulicity. Given uncontrolled BG at this " time, advised patient to increase Lantus to 54 units at bedtime. Given PMH of obesity and CV risk factors, also discussed SGLT2 therapy. Patient reports that she has experienced UTIs in the past although none recently. Renal function is adequate at this time. Discussed MOA and provided education on increased hydration. Patient verbalized understanding and is agreeable with started additional therapy. Will follow up in one week for further dose adjustments.     Medication Changes:  CONTINUE:  Glimepiride 4 mg twice daily (only taking at bedtime)  Metformin XR 1000 mg BID (only taking at bedtime)  START  Jardiance 10 mg daily   Mounjaro 2.5 mg once weekly (when PA approved)  INCREASE  Lantus 54 units at bedtime    Monitoring and Education:  Counseled patient on Jardiance MOA, expectations, side effects, duration of therapy, administration, and monitoring parameters.  Reviewed the benefits of SGLT-2i therapy, such as glycemic control and kidney and CV protection.  Advised patient to practice proper  hygiene to reduce risk of UTIs or yeast infections.  Advised patient to maintain adequate fluid intake to remain hydrated while on SGLT2i therapy.  Answered all patient questions and concerns    Clinical Pharmacist follow-up: 11/12/24 @ 1030, Telehealth visit    Continue all meds under the continuation of care with the referring provider and clinical pharmacy team.    Thank you,  Chichi Moreno, PharmD  Clinical Pharmacist  [phone]    Verbal consent to manage patient's drug therapy was obtained from the patient. They were informed they may decline to participate or withdraw from participation in pharmacy services at any time.

## 2024-11-09 ENCOUNTER — PHARMACY VISIT (OUTPATIENT)
Dept: PHARMACY | Facility: CLINIC | Age: 64
End: 2024-11-09
Payer: MEDICAID

## 2024-11-12 ENCOUNTER — APPOINTMENT (OUTPATIENT)
Dept: PHARMACY | Facility: HOSPITAL | Age: 64
End: 2024-11-12
Payer: COMMERCIAL

## 2024-11-12 DIAGNOSIS — E11.8 DIABETES MELLITUS WITH COMPLICATION (MULTI): ICD-10-CM

## 2024-11-12 NOTE — PROGRESS NOTES
Clinical Pharmacy Appointment    Patient ID: Mindy Dominguez is a 64 y.o. female who presents for Follow Up appointment.     Referring Provider: Tammie Aldridge DO  PCP: Tammie Aldridge DO   Last visit with PCP: 10/15/24   Next visit with PCP: 1/21/25    Subjective     DIABETES MELLITUS TYPE II:    Known diabetic complications: neuropathy.  Does patient follow with Endocrinology: No  Last optometry exam: 10/11/24  Most recent visit in Podiatry: unknown-- patient denies sores or cuts on feet today       Current diabetic medications include:  Glimepiride 4 mg twice daily (only taking at bedtime)  Metformin XR 1000 mg once daily (only taking at bedtime)  Jardiance 10 mg daily - to start today  Mounjaro 2.5 mg once weekly - to be delivered today  Lantus 54 units at bedtime    Adverse Effects: None     Glucose Readings:  Glucometer/CGM Type: Freestyle Aida 3     7-day:   TAR: 99%  TIR: 1%  TBR: 0%    Any episodes of hypoglycemia? No, none reported .  Did patient treat episode of hypoglycemia appropriately? N/A    Lifestyle:  Diet: trying to incorporate meal for breakfast. Trying to drink less sugar and eat fewer sugary snacks. Drinks diet pop. Purchased water packets with zero calories.    Primary/Secondary Prevention:   - Statin? Yes - Atorvastatin 80 mg daily   - ACE-I/ARB? No  - Aspirin? No     Pertinent PMH Review:  - PMH of Pancreatitis: No  - PMH of Retinopathy: No  - PMH of Urinary Tract Infections: Yes (remote history per patient)  - PMH of MTC: No  - PMH of ASCVD: Yes  - PMH of CKD: No  - PMH of HF: No   - PMH of Obesity: Yes     Medication Reconciliation:  No changes     Drug Interactions  No relevant drug interactions were noted.     Medication System Management  Patient's preferred pharmacy: [facility]  Adherence/Organization: Does not wake up early enough to take morning medications. Has struggled with adherence with twice daily medications.  Affordability/Accessibility: No issues     Objective    Allergies   Allergen Reactions    Amoxicillin-Pot Clavulanate Unknown    Lisinopril Unknown    Tetracycline Unknown     tongue swells    Sulfa (Sulfonamide Antibiotics) Hives and Rash     Social History     Social History Narrative    Not on file      Medication Review  Current Outpatient Medications   Medication Instructions    amLODIPine (NORVASC) 2.5 mg, oral, Daily    atorvastatin (LIPITOR) 80 mg, oral, Daily    blood-glucose sensor (FreeStyle Aida 3 Sensor) device Use as directed to check your blood at least 5 times daily. Change sensor every 14 days.    buPROPion (WELLBUTRIN) 100 mg, oral, Nightly    cholecalciferol (VITAMIN D-3) 2,000 Units, oral, Daily    clonazePAM (KlonoPIN) 0.5 mg tablet TAKE 1 TABLET AT BEDTIME AS NEEDED FOR ANXIETY    DULoxetine (Cymbalta) 60 mg DR capsule TAKE 1 CAPSULE BY MOUTH EVERY DAY    empagliflozin (JARDIANCE) 10 mg, oral, Daily    estrogens (conjugated) (PREMARIN) 0.5 g, vaginal, Daily, Take 1 gram nightly for 1 week and then take 1 gram on Monday, Wednesday, and Friday    FreeStyle Aida 3 [place] misc Use as instructed to check blood glucose at least 5 times daily.    FreeStyle Lite Strips strip 4 times daily    gabapentin enacarbil (Horizant) 600 mg tablet extended release ER tablet TAKE 1 TABLET BY MOUTH EVERY DAY WITH FOOD AT ABOUT 5PM    gemfibrozil (LOPID) 600 mg, oral, 2 times daily    glimepiride (Amaryl) 4 mg tablet 1 tablet, take 1 tablet by mouth twice a day with food    insulin glargine (Lantus Solostar U-100 Insulin) 100 unit/mL (3 mL) pen INJECT 50 UNITS SUBCUTANEOUSLY AT BEDTIME AS DIRECTED    levothyroxine (Synthroid, Levoxyl) 150 mcg tablet TAKE 1 TABLEY BY MOUTH ONCE DAILY, EXCEPT SATURDAYS & SUNDAYS TAKE 1/2 TAB    metFORMIN (Glucophage) 1,000 mg tablet TAKE 1 TABLET BY MOUTH TWICE A DAY WITH MEALS    Mounjaro 2.5 mg, subcutaneous, Weekly    rOPINIRole (REQUIP) 2 mg, oral, Nightly    SUMAtriptan (Imitrex) 50 mg tablet oral, TAKE 1 TABLET FOR MIGRAINE  "RELIEF. MAY REPEAT EVERY 2 HOURS. DO NOT TAKE MORE THAN 3 TABLETS IN 24 HOUR PERIOD    UltiCare Pen Needle 32 gauge x 5/32\" needle USE EVERY DAY AS DIRECTED AT NIGHT      Vitals  BP Readings from Last 2 Encounters:   10/15/24 145/89   05/17/24 143/80     BMI Readings from Last 1 Encounters:   10/15/24 34.95 kg/m²      Labs  A1C  Lab Results   Component Value Date    HGBA1C 12.4 (H) 09/10/2024    HGBA1C 11.6 (A) 04/02/2024    HGBA1C 8.8 (A) 09/19/2023     BMP  Lab Results   Component Value Date    CALCIUM 9.6 09/19/2023     09/19/2023    K 4.3 09/19/2023    CO2 26 09/19/2023     09/19/2023    BUN 11 09/19/2023    CREATININE 0.78 09/19/2023     LFTs  Lab Results   Component Value Date    ALT 23 09/19/2023    AST 20 09/19/2023    ALKPHOS 104 09/19/2023    BILITOT 0.5 09/19/2023     FLP  Lab Results   Component Value Date    TRIG 200 (H) 04/27/2023    CHOL 230 (H) 04/27/2023    LDLF 150 (H) 04/27/2023    HDL 39.8 (A) 04/27/2023     Urine Microalbumin  Lab Results   Component Value Date    MICROALBCREA 30 - 300 (A) 04/02/2024     Weight Management  Wt Readings from Last 3 Encounters:   10/15/24 95.3 kg (210 lb)   05/17/24 93 kg (205 lb)   05/09/24 93 kg (205 lb)      There is no height or weight on file to calculate BMI.     Assessment/Plan   Problem List Items Addressed This Visit       Diabetes mellitus with complication (Multi)     Patient's goal A1c is < 7.0%. Is pt at goal? No, most recent A1c on 9/10/24 of 12.4%. Prior A1c was 11.6% on 4/2/24. Next A1c will be due after 12/10/24.        Rationale for plan: Patient's SMBGs are above goal nearly all the time. At previous visit, increased Lantus dose and added Jardiance to regimen. Patient reports that she picked up the Jardiance yesterday and is planning to start today. Reports that sugars remain elevated despite increase in Lantus dose. PA for mounjaro was also approved since previous visit and delivery is expected today. Patient is planning to start " once shipment arrives. Given multiple changes in medication regimen today, will hold off on further dose adjustments of insulin. Will follow up in one week to assess tolerability and blood glucose control.     Medication Changes:  CONTINUE:  Glimepiride 4 mg twice daily (only taking at bedtime)  Metformin XR 1000 mg twice daily daily (only taking at bedtime)  Lantus 54 units at bedtime  START  Jardiance 10 mg daily - picked up yesterday; start today  Mounjaro 2.5 mg once weekly - to be delivered today; start today    Monitoring and Education:  Counseled patient on Mounjaro MOA, expectations, side effects, duration of therapy, administration, and monitoring parameters.  Provided detailed dosing and administration counseling to ensure proper technique.   Reviewed Mounjaro titration schedule, starting with 2.5 mg once weekly to a goal of 15 mg once weekly if tolerated  Counseled patient on the benefits of GLP-1ra glycemic control and weight loss  Reviewed storage requirements of Mounjaro when not in use, and when to administer the medication if a dose is missed.  Advised patient that they may experience improved satiety after meals and portion sizes of meals may be reduced as doses of Mounjaro increase.  Counseled patient on Jardiance MOA, expectations, side effects, duration of therapy, administration, and monitoring parameters.  Reviewed the benefits of SGLT-2i therapy, such as glycemic control and kidney and CV protection.  Advised patient to practice proper  hygiene to reduce risk of UTIs or yeast infections.  Advised patient to maintain adequate fluid intake to remain hydrated while on SGLT2i therapy.  Answered all patient questions and concerns.    Clinical Pharmacist follow-up: 11/19/24 @ 1030, Telehealth visit    Continue all meds under the continuation of care with the referring provider and clinical pharmacy team.    Thank you,  Chichi Moreno, PharmD  Clinical Pharmacist  [phone]    Verbal  consent to manage patient's drug therapy was obtained from the patient. They were informed they may decline to participate or withdraw from participation in pharmacy services at any time.

## 2024-11-17 ENCOUNTER — CLINICAL SUPPORT (OUTPATIENT)
Dept: SLEEP MEDICINE | Facility: CLINIC | Age: 64
End: 2024-11-17
Payer: COMMERCIAL

## 2024-11-17 DIAGNOSIS — G47.33 OSA (OBSTRUCTIVE SLEEP APNEA): ICD-10-CM

## 2024-11-18 VITALS
DIASTOLIC BLOOD PRESSURE: 88 MMHG | HEART RATE: 99 BPM | OXYGEN SATURATION: 93 % | RESPIRATION RATE: 12 BRPM | SYSTOLIC BLOOD PRESSURE: 139 MMHG

## 2024-11-18 ASSESSMENT — SLEEP AND FATIGUE QUESTIONNAIRES
ESS-CHAD TOTAL SCORE: 11
HOW LIKELY ARE YOU TO NOD OFF OR FALL ASLEEP WHILE WATCHING TV: SLIGHT CHANCE OF DOZING
HOW LIKELY ARE YOU TO NOD OFF OR FALL ASLEEP WHILE LYING DOWN TO REST IN THE AFTERNOON WHEN CIRCUMSTANCES PERMIT: HIGH CHANCE OF DOZING
HOW LIKELY ARE YOU TO NOD OFF OR FALL ASLEEP WHILE SITTING AND READING: SLIGHT CHANCE OF DOZING
HOW LIKELY ARE YOU TO NOD OFF OR FALL ASLEEP IN A CAR, WHILE STOPPED FOR A FEW MINUTES IN TRAFFIC: WOULD NEVER DOZE
SITING INACTIVE IN A PUBLIC PLACE LIKE A CLASS ROOM OR A MOVIE THEATER: SLIGHT CHANCE OF DOZING
HOW LIKELY ARE YOU TO NOD OFF OR FALL ASLEEP WHILE SITTING AND TALKING TO SOMEONE: SLIGHT CHANCE OF DOZING
HOW LIKELY ARE YOU TO NOD OFF OR FALL ASLEEP WHEN YOU ARE A PASSENGER IN A CAR FOR AN HOUR WITHOUT A BREAK: SLIGHT CHANCE OF DOZING
HOW LIKELY ARE YOU TO NOD OFF OR FALL ASLEEP WHILE SITTING QUIETLY AFTER LUNCH WITHOUT ALCOHOL: HIGH CHANCE OF DOZING

## 2024-11-18 NOTE — PROGRESS NOTES
[facility] TECH NOTE:     Patient: Mindy Dominguez   MRN//AGE: [id number]  1960  64 y.o.   Technologist: Cynthia Miller RRT-SDS   Room: 107   Service Date: 2024        Sleep Testing Location: [facility]:     TECHNOLOGIST SLEEP STUDY PROCEDURE NOTE:   This sleep study is being conducted according to the policies and procedures outlined by the [employer] accreditation standards.  The sleep study procedure and processes involved during this appointment was explained to the patient/patient’s family, questions were answered. The patient/family verbalized understanding.      The patient is a 64 y.o. year old female scheduled for a CPAP titration. she arrived for her appointment.      The study that was ultimately completed was a CPAP titration.    The full study Was completed.  Patient questionnaires completed?: yes     Consents signed? yes    Initial Fall Risk Screening:     Mindy has not fallen in the last 6 months. her did not result in injury. Mindy does not have a fear of falling. He does not need assistance with sitting, standing, or walking. she does not need assistance walking in her home. she does not need assistance in an unfamiliar setting. The patient is notusing an assistive device.     Brief Study observations: Patient came in for a CPAP titration. Patient had a hard time falling asleep and maintaining sleep. Patient had Arousals of Respiratory, Spontaneous and Limb movements. Patients Respiratory events were Hypopneas and Flow Limitations. Patient had intermittent, mild snoring. Patient was up 1 time throughout the night. Patient appeared to have tolerated CPAP. Patient did not go into REM.     Deviation to order/protocol and reason:       If PAP, which was preferred mask/pressure/mode: [employer] Eulalio Solo Nasal Mask- Small/ 9cmH2O - Cflex 3/ CPAP      Other:None    After the procedure, the patient/family was informed to ensure followup with ordering clinician for testing results.       Technologist: Cynthia Miller, RRT-SDS

## 2024-11-21 DIAGNOSIS — G25.81 RESTLESS LEGS SYNDROME: ICD-10-CM

## 2024-11-21 RX ORDER — ROPINIROLE 2 MG/1
2 TABLET, FILM COATED ORAL NIGHTLY
Qty: 90 TABLET | Refills: 1 | Status: SHIPPED | OUTPATIENT
Start: 2024-11-21

## 2024-11-25 DIAGNOSIS — F32.A DEPRESSION, UNSPECIFIED: ICD-10-CM

## 2024-11-25 DIAGNOSIS — E11.8 DIABETES MELLITUS WITH COMPLICATION (MULTI): ICD-10-CM

## 2024-11-25 RX ORDER — INSULIN GLARGINE 100 [IU]/ML
INJECTION, SOLUTION SUBCUTANEOUS
Qty: 15 ML | Refills: 2 | Status: SHIPPED | OUTPATIENT
Start: 2024-11-25

## 2024-11-25 RX ORDER — DULOXETIN HYDROCHLORIDE 60 MG/1
60 CAPSULE, DELAYED RELEASE ORAL DAILY
Qty: 90 CAPSULE | Refills: 1 | Status: SHIPPED | OUTPATIENT
Start: 2024-11-25

## 2024-12-03 ENCOUNTER — APPOINTMENT (OUTPATIENT)
Dept: PHARMACY | Facility: HOSPITAL | Age: 64
End: 2024-12-03
Payer: COMMERCIAL

## 2024-12-06 PROCEDURE — RXMED WILLOW AMBULATORY MEDICATION CHARGE

## 2024-12-09 DIAGNOSIS — G47.33 OBSTRUCTIVE SLEEP APNEA: Primary | ICD-10-CM

## 2024-12-10 ENCOUNTER — PHARMACY VISIT (OUTPATIENT)
Dept: PHARMACY | Facility: CLINIC | Age: 64
End: 2024-12-10
Payer: MEDICAID

## 2024-12-11 ENCOUNTER — APPOINTMENT (OUTPATIENT)
Dept: PHARMACY | Facility: HOSPITAL | Age: 64
End: 2024-12-11
Payer: COMMERCIAL

## 2024-12-11 DIAGNOSIS — E11.8 DIABETES MELLITUS WITH COMPLICATION (MULTI): ICD-10-CM

## 2024-12-11 RX ORDER — TIRZEPATIDE 5 MG/.5ML
5 INJECTION, SOLUTION SUBCUTANEOUS WEEKLY
Qty: 2 ML | Refills: 2 | Status: SHIPPED | OUTPATIENT
Start: 2024-12-11

## 2024-12-11 NOTE — PROGRESS NOTES
Clinical Pharmacy Appointment    Patient ID: Mindy Dominguez is a 64 y.o. female who presents for Follow Up appointment.     Referring Provider: Tammie Aldridge DO  PCP: Tammie Aldridge DO   Last visit with PCP: 10/15/24   Next visit with PCP: 1/21/25    Subjective     DIABETES MELLITUS TYPE II:    Known diabetic complications: neuropathy.  Does patient follow with Endocrinology: No  Last optometry exam: 10/11/24  Most recent visit in Podiatry: unknown-- patient denies sores or cuts on feet today       Current diabetic medications include:  Glimepiride 4 mg twice daily   Metformin XR 1000 mg twice daily   Jardiance 10 mg daily   Mounjaro 2.5 mg once weekly on Thursdays  Lantus 54 units at bedtime    Adverse Effects: None     Glucose Readings:  Glucometer/CGM Type: Freestyle Aida 3 with phone application    7-day:   TAR: 34% + 8%  TIR: 56%  TBR: 2%    Any episodes of hypoglycemia? Yes, 2% per CGM .  Did patient treat episode of hypoglycemia appropriately? N/A  Glucagon: No    Lifestyle:  Diet: trying to incorporate meal for breakfast. Trying to drink less sugar and eat fewer sugary snacks. Drinks diet pop. Purchased water packets with zero calories.  Physical activity:     Primary/Secondary Prevention:   - Statin? Yes - Atorvastatin 80 mg daily   - ACE-I/ARB? No  - Aspirin? No     Pertinent PMH Review:  - PMH of Pancreatitis: No  - PMH of Retinopathy: No  - PMH of Urinary Tract Infections: Yes (remote history per patient)  - PMH of MTC: No  - PMH of ASCVD: Yes  - PMH of CKD: No  - PMH of HF: No   - PMH of Obesity: Yes     Medication Reconciliation:  No changes     Drug Interactions  No relevant drug interactions were noted.     Medication System Management  Patient's preferred pharmacy: [facility]  Adherence/Organization: Does not wake up early enough to take morning medications. Has struggled with adherence with twice daily medications.  Affordability/Accessibility: No issues     Objective   Allergies    Allergen Reactions    Amoxicillin-Pot Clavulanate Unknown    Lisinopril Unknown    Tetracycline Unknown     tongue swells    Sulfa (Sulfonamide Antibiotics) Hives and Rash     Social History     Social History Narrative    Not on file      Medication Review  Current Outpatient Medications   Medication Instructions    amLODIPine (NORVASC) 2.5 mg, oral, Daily    atorvastatin (LIPITOR) 80 mg, oral, Daily    blood-glucose sensor (FreeStyle Aida 3 Sensor) device Use as directed to check your blood at least 5 times daily. Change sensor every 14 days.    buPROPion (WELLBUTRIN) 100 mg, oral, Nightly    cholecalciferol (VITAMIN D-3) 2,000 Units, oral, Daily    clonazePAM (KlonoPIN) 0.5 mg tablet TAKE 1 TABLET AT BEDTIME AS NEEDED FOR ANXIETY    DULoxetine (CYMBALTA) 60 mg, oral, Daily, Do not crush or chew.    empagliflozin (JARDIANCE) 10 mg, oral, Daily    estrogens (conjugated) (PREMARIN) 0.5 g, vaginal, Daily, Take 1 gram nightly for 1 week and then take 1 gram on Monday, Wednesday, and Friday    FreeStyle Aida 3 [place] misc Use as instructed to check blood glucose at least 5 times daily.    FreeStyle Lite Strips strip 4 times daily    gabapentin enacarbil (Horizant) 600 mg tablet extended release ER tablet TAKE 1 TABLET BY MOUTH EVERY DAY WITH FOOD AT ABOUT 5PM    gemfibrozil (LOPID) 600 mg, oral, 2 times daily    glimepiride (Amaryl) 4 mg tablet 1 tablet, take 1 tablet by mouth twice a day with food    insulin glargine (Lantus Solostar U-100 Insulin) 100 unit/mL (3 mL) pen INJECT 54 UNITS SUBCUTANEOUSLY AT BEDTIME AS DIRECTED    levothyroxine (Synthroid, Levoxyl) 150 mcg tablet TAKE 1 TABLEY BY MOUTH ONCE DAILY, EXCEPT SATURDAYS & SUNDAYS TAKE 1/2 TAB    metFORMIN (Glucophage) 1,000 mg tablet TAKE 1 TABLET BY MOUTH TWICE A DAY WITH MEALS    Mounjaro 2.5 mg, subcutaneous, Weekly    Mounjaro 5 mg, subcutaneous, Weekly    rOPINIRole (REQUIP) 2 mg, oral, Nightly    SUMAtriptan (Imitrex) 50 mg tablet oral, TAKE 1 TABLET  "FOR MIGRAINE RELIEF. MAY REPEAT EVERY 2 HOURS. DO NOT TAKE MORE THAN 3 TABLETS IN 24 HOUR PERIOD    UltiCare Pen Needle 32 gauge x 5/32\" needle USE EVERY DAY AS DIRECTED AT NIGHT      Vitals  BP Readings from Last 2 Encounters:   11/17/24 139/88   10/15/24 145/89     BMI Readings from Last 1 Encounters:   10/15/24 34.95 kg/m²      Labs  A1C  Lab Results   Component Value Date    HGBA1C 12.4 (H) 09/10/2024    HGBA1C 11.6 (A) 04/02/2024    HGBA1C 8.8 (A) 09/19/2023     BMP  Lab Results   Component Value Date    CALCIUM 9.6 09/19/2023     09/19/2023    K 4.3 09/19/2023    CO2 26 09/19/2023     09/19/2023    BUN 11 09/19/2023    CREATININE 0.78 09/19/2023     LFTs  Lab Results   Component Value Date    ALT 23 09/19/2023    AST 20 09/19/2023    ALKPHOS 104 09/19/2023    BILITOT 0.5 09/19/2023     FLP  Lab Results   Component Value Date    TRIG 200 (H) 04/27/2023    CHOL 230 (H) 04/27/2023    LDLF 150 (H) 04/27/2023    HDL 39.8 (A) 04/27/2023     Urine Microalbumin  Lab Results   Component Value Date    MICROALBCREA 30 - 300 (A) 04/02/2024     Weight Management  Wt Readings from Last 3 Encounters:   10/15/24 95.3 kg (210 lb)   05/17/24 93 kg (205 lb)   05/09/24 93 kg (205 lb)      There is no height or weight on file to calculate BMI.     Assessment/Plan   Problem List Items Addressed This Visit       Diabetes mellitus with complication (Multi)    Relevant Medications    tirzepatide (Mounjaro) 5 mg/0.5 mL pen injector    Other Relevant Orders    Referral to Clinical Pharmacy     Patient's goal A1c is < 7.0%. Is pt at goal? No, most recent A1c on 9/10/24 of 12.4%. Prior A1c was 11.6% on 4/2/24. Next A1c will be due after 12/10/24.        Rationale for plan: Patient's SMBGs are significantly improved from previous visit. Per CGM, BG is in range 56% of the time. At previous visit, glucose was in range 1% of the time. Patient started both Jardiance 10 mg daily and Mounjaro 5 mg once weekly at previous visit. " Patient reports that she is tolerating both new medications well. While BG is in range a greater portion of time, low BG events have increased to 2%. Occurrences seem to happen overnight within a few hours after the glimepiride dose. Advised patient to discontinue glimepiride as she was missing AM doses a significant portion of the time. Also discussed increasing Mounjaro to 5 mg once weekly now that 4 injections have been completed. Patient is agreeable and new prescription sent to preferred pharmacy. Plan for follow up in about 1 month to assess BG control and determine need for further dose adjustments.     Medication Changes:  CONTINUE:  Metformin 1000 mg once daily at bedtime  Lantus 54 units at bedtime  Jardiance 10 mg daily at bedtime   INCREASE  Mounjaro 5 mg once weekly  DISCONTINUE:  Glimepiride 4 mg twice daily     Future Considerations:   Could consider increase in Jardiance to 25 mg daily    Monitoring and Education:  Counseled patient on Mounjaro MOA, expectations, side effects, duration of therapy, administration, and monitoring parameters.  Provided detailed dosing and administration counseling to ensure proper technique.   Reviewed Mounjaro titration schedule, starting with 2.5 mg once weekly to a goal of 15 mg once weekly if tolerated  Counseled patient on the benefits of GLP-1ra glycemic control and weight loss  Reviewed storage requirements of Mounjaro when not in use, and when to administer the medication if a dose is missed.  Advised patient that they may experience improved satiety after meals and portion sizes of meals may be reduced as doses of Mounjaro increase.  Counseled patient on Jardiance MOA, expectations, side effects, duration of therapy, administration, and monitoring parameters.  Reviewed the benefits of SGLT-2i therapy, such as glycemic control and kidney and CV protection.  Advised patient to practice proper  hygiene to reduce risk of UTIs or yeast infections.  Advised patient  to maintain adequate fluid intake to remain hydrated while on SGLT2i therapy.  Answered all patient questions and concerns.    Clinical Pharmacist follow-up: 1/8/25 @ 11 am, Telehealth visit    Continue all meds under the continuation of care with the referring provider and clinical pharmacy team.    Thank you,  Chichi Moreno, PharmD  Clinical Pharmacist  [phone]    Verbal consent to manage patient's drug therapy was obtained from the patient. They were informed they may decline to participate or withdraw from participation in pharmacy services at any time.

## 2024-12-19 ENCOUNTER — APPOINTMENT (OUTPATIENT)
Dept: PHARMACY | Facility: HOSPITAL | Age: 64
End: 2024-12-19
Payer: COMMERCIAL

## 2025-01-08 ENCOUNTER — APPOINTMENT (OUTPATIENT)
Dept: PHARMACY | Facility: HOSPITAL | Age: 65
End: 2025-01-08
Payer: COMMERCIAL

## 2025-01-15 ENCOUNTER — APPOINTMENT (OUTPATIENT)
Dept: SLEEP MEDICINE | Facility: CLINIC | Age: 65
End: 2025-01-15
Payer: COMMERCIAL

## 2025-01-16 NOTE — PROGRESS NOTES
Subjective   Patient ID: Mindy Dominguez is a 64 y.o. female who presents for Follow-up (3 months).     Diabetes: She is current on extended release metformin (now once daily), lantus 54u QHS, glimepiride bid. She is on mounjaro now and feels it is helping. She is using the michael, it is helping her more now. She is working with the  pharmacist.     Skin itching: She gets a rash under her breasts that itches. She uses      HTN: She is on amlodipine, no SE's.      Depression and anxiety, panic attacks: Not seeing psych, she does follow with a counsellor. She has had some workup for chronic faitgue. She did notice problems with mental health off the cymbalta. Anxiety and depression were worse than normal. She is on wellbutrin for the last 9 months and she feels it is helping with the depression symptoms. She is using the klonopin prn rarely for stressful situations and insomnia.      Peripheral neuropathy, RLS: 2/2 DM. On cymbalta and gabapentin. She takes ropinirole as well. She follows with Dr. Leary. Her RLS symptoms are actually full body.      Hypothyroidism: On replacement.     HLD: On atorvastatin and gemfibrozil. No cardiac history. She has had calcium score about 2 years ago that was clear.      Migraines: Doing ok with prn sumatriptan. Hasn't had any in the last few years. Triggered by exertion and high temperatures.      Atrophic vaginitis: She has premarin cream from gyn (Dr. Morrison) but hasn't used it. No recent episodes of bleeding.      MIKAYLA: She started CPAP 15y ago. Within the last 4 years she was extremely compliant. She has lost about 90lb in this same time period and she believes the problem is she needs her pressure changed. Machine is through [facility].      She had a large surgery on right arm in 2010, the olecranon surgery failed and she deals with chronic right pinky finger pain (Dr. Mays).      Review of systems completed and unremarkable other than what is documented in  HPI.    Objective   There were no vitals taken for this visit.        Assessment/Plan   #Diabetes mellitus:  On metformin xr, jardiance, lantus 54u  On mounjaro    #Rash  Rx sent combo cream     #HTN:   Reasonable control on amlodipine     #Depression and anxiety  #Panic attacks:  #Insomnia:  Following with counsellor only  Doing well on wellbutrin and cymbalta  Has klonopin prn for insomnia  CSA signed, OARRS reviewed     #Peripheral neuropathy  #RLS:   2/2 DM  On cymbalta, gabapentin, and ropinirole  Following with podiatry for care     #Hypothyroidism:   Controlled on replacement     #HLD:   Controlled on atorvastatin and gemfibrozil     #Migraines:   Resolved  Has sumatriptan for rescue     #Atrophic vaginitis:   She has premarin cream from gyn (Dr. Morrison)  No recent episodes of bleeding      #MIKAYLA:   She started CPAP 15y ago  Ordering new sleep study     HCM  Mammogram May negative  Flu vaccine today  UTD for COVID vaccines  She is well overdue for C-scope, we will set up in Summer, she has seen Ernie [place]

## 2025-01-21 ENCOUNTER — TELEMEDICINE (OUTPATIENT)
Dept: PHARMACY | Facility: HOSPITAL | Age: 65
End: 2025-01-21
Payer: COMMERCIAL

## 2025-01-21 ENCOUNTER — APPOINTMENT (OUTPATIENT)
Dept: PRIMARY CARE | Facility: CLINIC | Age: 65
End: 2025-01-21
Payer: COMMERCIAL

## 2025-01-21 DIAGNOSIS — R21 RASH: ICD-10-CM

## 2025-01-21 DIAGNOSIS — Z12.11 SCREENING FOR COLON CANCER: ICD-10-CM

## 2025-01-21 DIAGNOSIS — E11.8 DIABETES MELLITUS WITH COMPLICATION (MULTI): ICD-10-CM

## 2025-01-21 DIAGNOSIS — F32.2 SEVERE MAJOR DEPRESSION (MULTI): ICD-10-CM

## 2025-01-21 DIAGNOSIS — E11.42 TYPE 2 DIABETES MELLITUS WITH PERIPHERAL NEUROPATHY: Primary | ICD-10-CM

## 2025-01-21 DIAGNOSIS — G25.81 RESTLESS LEGS SYNDROME: ICD-10-CM

## 2025-01-21 PROCEDURE — RXMED WILLOW AMBULATORY MEDICATION CHARGE

## 2025-01-21 RX ORDER — TIRZEPATIDE 5 MG/.5ML
5 INJECTION, SOLUTION SUBCUTANEOUS WEEKLY
Qty: 2 ML | Refills: 2 | Status: SHIPPED | OUTPATIENT
Start: 2025-01-21

## 2025-01-21 RX ORDER — CLOTRIMAZOLE AND BETAMETHASONE DIPROPIONATE 10; .64 MG/G; MG/G
1 CREAM TOPICAL 2 TIMES DAILY
Qty: 45 G | Refills: 1 | Status: SHIPPED | OUTPATIENT
Start: 2025-01-21 | End: 2025-03-22

## 2025-01-21 NOTE — PROGRESS NOTES
Clinical Pharmacy Appointment    Patient ID: Mindy Dominguez is a 64 y.o. female who presents for Follow Up appointment.     Referring Provider: Tammie Aldridge DO  PCP: Tammie Aldridge DO   Last visit with PCP: 10/15/24   Next visit with PCP: 25    Subjective     DIABETES MELLITUS TYPE II:    Known diabetic complications: neuropathy.  Does patient follow with Endocrinology: No  Last optometry exam: 10/11/24  Most recent visit in Podiatry: unknown-- patient denies sores or cuts on feet today       Current diabetic medications include:  Metformin XR 1000 mg once daily at bedtime    Jardiance 10 mg daily   Mounjaro 5 mg once weekly on   Lantus 54 units at bedtime    Adverse Effects: None     Past diabetic medications  Glimepiride 4 mg twice daily (stopped due to low BG readings when starting Mounjaro)    Glucose Readings:  Glucometer/CGM Type: Freestyle Aida 3 with phone application  Sent invitation for [employer].    Current B mg/dL    14-day:   TAR: 16% + 83%  TIR: 1%  TBR: 0%    30-day:   TAR: 30% + 41%  TIR: 29%  TBR: 0%    Any episodes of hypoglycemia? No, none .  Did patient treat episode of hypoglycemia appropriately? N/A  Glucagon: No    Lifestyle:  Diet: trying to incorporate meal for breakfast. Trying to drink less sugar and eat fewer sugary snacks. Drinks diet pop. Purchased water packets with zero calories. Trying to incorporate more salads and vegetables. Eating more candy around [place].   Physical activity: Limited    Primary/Secondary Prevention:   - Statin? Yes - Atorvastatin 80 mg daily   - ACE-I/ARB? No  - Aspirin? No     Pertinent PMH Review:  - PMH of Pancreatitis: No  - PMH of Retinopathy: No  - PMH of Urinary Tract Infections: Yes (remote history per patient)  - PMH of MTC: No  - PMH of ASCVD: Yes  - PMH of CKD: No  - PMH of HF: No   - PMH of Obesity: Yes     Medication Reconciliation:  No changes     Drug Interactions  No relevant drug interactions were noted.      Medication System Management  Patient's preferred pharmacy: [facility]  Adherence/Organization: Does not wake up early enough to take morning medications. Has struggled with adherence with twice daily medications.  Affordability/Accessibility: No issues     Objective   Allergies   Allergen Reactions    Amoxicillin-Pot Clavulanate Unknown    Lisinopril Unknown    Tetracycline Unknown     tongue swells    Sulfa (Sulfonamide Antibiotics) Hives and Rash     Social History     Social History Narrative    Not on file      Medication Review  Current Outpatient Medications   Medication Instructions    amLODIPine (NORVASC) 2.5 mg, oral, Daily    atorvastatin (LIPITOR) 80 mg, oral, Daily    blood-glucose sensor (FreeStyle Aida 3 Sensor) device Use as directed to check your blood at least 5 times daily. Change sensor every 14 days.    buPROPion (WELLBUTRIN) 100 mg, oral, Nightly    cholecalciferol (VITAMIN D-3) 2,000 Units, oral, Daily    clonazePAM (KlonoPIN) 0.5 mg tablet TAKE 1 TABLET AT BEDTIME AS NEEDED FOR ANXIETY    DULoxetine (CYMBALTA) 60 mg, oral, Daily, Do not crush or chew.    empagliflozin (JARDIANCE) 25 mg, oral, Daily    estrogens (conjugated) (PREMARIN) 0.5 g, vaginal, Daily, Take 1 gram nightly for 1 week and then take 1 gram on Monday, Wednesday, and Friday    FreeStyle Aida 3 [place] misc Use as instructed to check blood glucose at least 5 times daily.    FreeStyle Lite Strips strip 4 times daily    gabapentin enacarbil (Horizant) 600 mg tablet extended release ER tablet TAKE 1 TABLET BY MOUTH EVERY DAY WITH FOOD AT ABOUT 5PM    gemfibrozil (LOPID) 600 mg, oral, 2 times daily    glimepiride (Amaryl) 4 mg tablet 1 tablet, take 1 tablet by mouth twice a day with food    insulin glargine (Lantus Solostar U-100 Insulin) 100 unit/mL (3 mL) pen INJECT 54 UNITS SUBCUTANEOUSLY AT BEDTIME AS DIRECTED    levothyroxine (Synthroid, Levoxyl) 150 mcg tablet TAKE 1 TABLEY BY MOUTH ONCE DAILY, EXCEPT SATURDAYS &  "SUNDAYS TAKE 1/2 TAB    metFORMIN (Glucophage) 1,000 mg tablet TAKE 1 TABLET BY MOUTH TWICE A DAY WITH MEALS    Mounjaro 5 mg, subcutaneous, Weekly    rOPINIRole (REQUIP) 2 mg, oral, Nightly    SUMAtriptan (Imitrex) 50 mg tablet oral, TAKE 1 TABLET FOR MIGRAINE RELIEF. MAY REPEAT EVERY 2 HOURS. DO NOT TAKE MORE THAN 3 TABLETS IN 24 HOUR PERIOD    UltiCare Pen Needle 32 gauge x 5/32\" needle USE EVERY DAY AS DIRECTED AT NIGHT      Vitals  BP Readings from Last 2 Encounters:   11/17/24 139/88   10/15/24 145/89     BMI Readings from Last 1 Encounters:   10/15/24 34.95 kg/m²      Labs  A1C  Lab Results   Component Value Date    HGBA1C 12.4 (H) 09/10/2024    HGBA1C 11.6 (A) 04/02/2024    HGBA1C 8.8 (A) 09/19/2023     BMP  Lab Results   Component Value Date    CALCIUM 9.6 09/19/2023     09/19/2023    K 4.3 09/19/2023    CO2 26 09/19/2023     09/19/2023    BUN 11 09/19/2023    CREATININE 0.78 09/19/2023     LFTs  Lab Results   Component Value Date    ALT 23 09/19/2023    AST 20 09/19/2023    ALKPHOS 104 09/19/2023    BILITOT 0.5 09/19/2023     FLP  Lab Results   Component Value Date    TRIG 200 (H) 04/27/2023    CHOL 230 (H) 04/27/2023    LDLF 150 (H) 04/27/2023    HDL 39.8 (A) 04/27/2023     Urine Microalbumin  Lab Results   Component Value Date    MICROALBCREA 30 - 300 (A) 04/02/2024     Weight Management  Wt Readings from Last 3 Encounters:   10/15/24 95.3 kg (210 lb)   05/17/24 93 kg (205 lb)   05/09/24 93 kg (205 lb)      There is no height or weight on file to calculate BMI.     Assessment/Plan   Problem List Items Addressed This Visit       Diabetes mellitus with complication (Multi)    Relevant Medications    tirzepatide (Mounjaro) 5 mg/0.5 mL pen injector    empagliflozin (Jardiance) 25 mg    Other Relevant Orders    Referral to Clinical Pharmacy       Patient's goal A1c is < 7.0%. Is pt at goal? No, most recent A1c on 9/10/24 of 12.4%. Prior A1c was 11.6% on 4/2/24. Next A1c will be due after 12/10/24. "        Rationale for plan: Patient's SMBGs are worsening when compared to previous visit. Per CGM, BG is in range 1% of the time. At previous visit, glucose was in range 56% of the time. Patient endorses missing doses of Mounjaro and having altered diet with higher carbohydrate intake. At this time, plan for patient to restart Mounjaro at 5 mg once weekly. Patient has 2 injections remaining of Mounjaro 2.5 mg once weekly - plan to use up before increasing to 5 mg once weekly. Also advised patient to increase Jardiance dose to 25 mg once weekly. Patient reports that she has been tolerating well and medication is covered fully by insurance. Plan for follow up in about 3 weeks to assess BG control and determine need for further dose adjustments.     Medication Changes:  CONTINUE:  Metformin 1000 mg once daily at bedtime  Lantus 54 units at bedtime  INCREASE:   Jardiance 25 mg daily at bedtime   Mounjaro 5 mg once weekly    Future Considerations:   Could consider increase in Jardiance to 25 mg daily    Monitoring and Education:  Counseled patient on Mounjaro MOA, expectations, side effects, duration of therapy, administration, and monitoring parameters.  Provided detailed dosing and administration counseling to ensure proper technique.   Reviewed Mounjaro titration schedule, starting with 2.5 mg once weekly to a goal of 15 mg once weekly if tolerated  Counseled patient on the benefits of GLP-1ra glycemic control and weight loss  Reviewed storage requirements of Mounjaro when not in use, and when to administer the medication if a dose is missed.  Advised patient that they may experience improved satiety after meals and portion sizes of meals may be reduced as doses of Mounjaro increase.  Counseled patient on Jardiance MOA, expectations, side effects, duration of therapy, administration, and monitoring parameters.  Reviewed the benefits of SGLT-2i therapy, such as glycemic control and kidney and CV protection.  Advised  patient to practice proper  hygiene to reduce risk of UTIs or yeast infections.  Advised patient to maintain adequate fluid intake to remain hydrated while on SGLT2i therapy.  Answered all patient questions and concerns.    Clinical Pharmacist follow-up: 2/11/25 @ 1040 am, Telehealth visit    Continue all meds under the continuation of care with the referring provider and clinical pharmacy team.    Thank you,  Chichi Moreno, PharmD  Clinical Pharmacist  [phone]    Verbal consent to manage patient's drug therapy was obtained from the patient. They were informed they may decline to participate or withdraw from participation in pharmacy services at any time.

## 2025-01-22 ENCOUNTER — PHARMACY VISIT (OUTPATIENT)
Dept: PHARMACY | Facility: CLINIC | Age: 65
End: 2025-01-22
Payer: MEDICAID

## 2025-01-31 DIAGNOSIS — E03.9 HYPOTHYROIDISM, UNSPECIFIED TYPE: ICD-10-CM

## 2025-01-31 RX ORDER — LEVOTHYROXINE SODIUM 150 UG/1
TABLET ORAL
Qty: 72 TABLET | Refills: 2 | Status: SHIPPED | OUTPATIENT
Start: 2025-01-31

## 2025-02-10 DIAGNOSIS — F32.2 SEVERE MAJOR DEPRESSION (MULTI): ICD-10-CM

## 2025-02-10 RX ORDER — BUPROPION HYDROCHLORIDE 100 MG/1
100 TABLET ORAL NIGHTLY
Qty: 90 TABLET | Refills: 3 | Status: SHIPPED | OUTPATIENT
Start: 2025-02-10

## 2025-02-11 ENCOUNTER — APPOINTMENT (OUTPATIENT)
Dept: PHARMACY | Facility: HOSPITAL | Age: 65
End: 2025-02-11
Payer: COMMERCIAL

## 2025-02-11 DIAGNOSIS — E11.8 DIABETES MELLITUS WITH COMPLICATION (MULTI): ICD-10-CM

## 2025-02-21 DIAGNOSIS — E11.42 TYPE 2 DIABETES MELLITUS WITH PERIPHERAL NEUROPATHY: ICD-10-CM

## 2025-02-21 RX ORDER — PEN NEEDLE, DIABETIC 30 GX3/16"
NEEDLE, DISPOSABLE MISCELLANEOUS
Qty: 100 EACH | Refills: 1 | Status: SHIPPED | OUTPATIENT
Start: 2025-02-21

## 2025-02-24 DIAGNOSIS — E11.8 DIABETES MELLITUS WITH COMPLICATION (MULTI): ICD-10-CM

## 2025-02-24 RX ORDER — INSULIN GLARGINE 100 [IU]/ML
INJECTION, SOLUTION SUBCUTANEOUS
Qty: 15 ML | Refills: 2 | Status: SHIPPED | OUTPATIENT
Start: 2025-02-24

## 2025-02-24 RX ORDER — PEN NEEDLE, DIABETIC 30 GX3/16"
NEEDLE, DISPOSABLE MISCELLANEOUS
Qty: 100 EACH | Refills: 1 | Status: SHIPPED | OUTPATIENT
Start: 2025-02-24

## 2025-02-25 ENCOUNTER — APPOINTMENT (OUTPATIENT)
Dept: PHARMACY | Facility: HOSPITAL | Age: 65
End: 2025-02-25
Payer: COMMERCIAL

## 2025-02-25 DIAGNOSIS — E11.8 DIABETES MELLITUS WITH COMPLICATION (MULTI): Primary | ICD-10-CM

## 2025-02-25 PROCEDURE — RXMED WILLOW AMBULATORY MEDICATION CHARGE

## 2025-02-25 RX ORDER — TIRZEPATIDE 2.5 MG/.5ML
2.5 INJECTION, SOLUTION SUBCUTANEOUS WEEKLY
Qty: 2 ML | Refills: 0 | Status: SHIPPED | OUTPATIENT
Start: 2025-02-25

## 2025-02-25 NOTE — PROGRESS NOTES
Clinical Pharmacy Appointment    Patient ID: Mindy Dominguez is a 64 y.o. female who presents for diabetes.     This is a Follow Up appointment.     Referring Provider: Tammie Aldridge DO  PCP: Tammie Aldridge DO   Last visit with PCP: 25  Next visit with PCP: 25    Subjective     DIABETES MELLITUS TYPE II:    Known diabetic complications: neuropathy.  Does patient follow with Endocrinology: No  Last optometry exam: 10/11/24  Most recent visit in Podiatry: unknown-- patient denies sores or cuts on feet today       Current diabetic medications include:  Metformin XR 1000 mg once daily at bedtime    Jardiance 25 mg daily   Mounjaro 5 mg once weekly on   Lantus 54 units at bedtime    Clarifications to regimen: Patient notes missed doses of Mounjaro and lantus in the past few weeks.   Adverse Effects: None     Past diabetic medications  Glimepiride 4 mg twice daily (stopped due to low BG readings when starting Mounjaro)    Glucose Readings:  Glucometer/CGM Type: Freestyle Aida 3 with phone application  Sent invitation for Libreview - not yet accepted     B mg/dL currently    TAR: 53% + 21%  TIR: 26%  TBR: 0%    Any episodes of hypoglycemia? No, none .  Did patient treat episode of hypoglycemia appropriately? N/A  Glucagon: No    Lifestyle:  Diet: trying to incorporate meal for breakfast. Trying to drink less sugar and eat fewer sugary snacks. Drinks diet pop. Purchased water packets with zero calories. Trying to incorporate more salads and vegetables. Eating more candy around [place]. Spaghetti with chickpea pasta.   Physical activity: Limited    Primary/Secondary Prevention:   - Statin? Yes - Atorvastatin 80 mg daily   - ACE-I/ARB? No  - Aspirin? No     Pertinent PMH Review:  - PMH of Pancreatitis: No  - PMH of Retinopathy: No  - PMH of Urinary Tract Infections: Yes (remote history per patient)  - PMH of MTC: No  - PMH of ASCVD: Yes  - PMH of CKD: No  - PMH of HF: No   - PMH of  Obesity: Yes     Medication Reconciliation:  No changes     Drug Interactions  No relevant drug interactions were noted.     Medication System Management  Patient's preferred pharmacy: [facility]  Adherence/Organization: Does not wake up early enough to take morning medications. Has struggled with adherence with twice daily medications.  Affordability/Accessibility: No issues     Objective   Allergies   Allergen Reactions    Amoxicillin-Pot Clavulanate Unknown    Lisinopril Unknown    Tetracycline Unknown     tongue swells    Sulfa (Sulfonamide Antibiotics) Hives and Rash     Social History     Social History Narrative    Not on file      Medication Review  Current Outpatient Medications   Medication Instructions    amLODIPine (NORVASC) 2.5 mg, oral, Daily    atorvastatin (LIPITOR) 80 mg, oral, Daily    blood-glucose sensor (FreeStyle Aida 3 Sensor) device Use as directed to check your blood at least 5 times daily. Change sensor every 14 days.    buPROPion (WELLBUTRIN) 100 mg, oral, Nightly    cholecalciferol (VITAMIN D-3) 2,000 Units, oral, Daily    clonazePAM (KlonoPIN) 0.5 mg tablet TAKE 1 TABLET AT BEDTIME AS NEEDED FOR ANXIETY    clotrimazole-betamethasone (Lotrisone) cream 1 Application, Topical, 2 times daily    DULoxetine (CYMBALTA) 60 mg, oral, Daily, Do not crush or chew.    empagliflozin (JARDIANCE) 25 mg, oral, Daily    estrogens (conjugated) (PREMARIN) 0.5 g, vaginal, Daily, Take 1 gram nightly for 1 week and then take 1 gram on Monday, Wednesday, and Friday    FreeStyle Aida 3 [place] misc Use as instructed to check blood glucose at least 5 times daily.    FreeStyle Lite Strips strip 4 times daily    gabapentin enacarbil (Horizant) 600 mg tablet extended release ER tablet TAKE 1 TABLET BY MOUTH EVERY DAY WITH FOOD AT ABOUT 5PM    gemfibrozil (LOPID) 600 mg, oral, 2 times daily    insulin glargine (Lantus Solostar U-100 Insulin) 100 unit/mL (3 mL) pen INJECT 54 UNITS SUBCUTANEOUSLY AT BEDTIME AS  "DIRECTED    levothyroxine (Synthroid, Levoxyl) 150 mcg tablet TAKE 1 TABLEY BY MOUTH ONCE DAILY, EXCEPT SATURDAYS & SUNDAYS TAKE 1/2 TAB    metFORMIN (Glucophage) 1,000 mg tablet TAKE 1 TABLET BY MOUTH TWICE A DAY WITH MEALS    Mounjaro 5 mg, subcutaneous, Weekly    pen needle, diabetic (BD [place] 2nd Gen Pen Needle) 32 gauge x 5/32\" needle USE AS INSTRUCTED TO INJECT INSULIN ONCE DAILY    rOPINIRole (REQUIP) 2 mg, oral, Nightly    SUMAtriptan (Imitrex) 50 mg tablet oral, TAKE 1 TABLET FOR MIGRAINE RELIEF. MAY REPEAT EVERY 2 HOURS. DO NOT TAKE MORE THAN 3 TABLETS IN 24 HOUR PERIOD    UltiCare Pen Needle 32 gauge x 5/32\" needle USE EVERY DAY AS DIRECTED AT NIGHT      Vitals  BP Readings from Last 2 Encounters:   11/17/24 139/88   10/15/24 145/89     BMI Readings from Last 1 Encounters:   10/15/24 34.95 kg/m²      Labs  A1C  Lab Results   Component Value Date    HGBA1C 12.4 (H) 09/10/2024    HGBA1C 11.6 (A) 04/02/2024    HGBA1C 8.8 (A) 09/19/2023     BMP  Lab Results   Component Value Date    CALCIUM 9.6 09/19/2023     09/19/2023    K 4.3 09/19/2023    CO2 26 09/19/2023     09/19/2023    BUN 11 09/19/2023    CREATININE 0.78 09/19/2023     LFTs  Lab Results   Component Value Date    ALT 23 09/19/2023    AST 20 09/19/2023    ALKPHOS 104 09/19/2023    BILITOT 0.5 09/19/2023     FLP  Lab Results   Component Value Date    TRIG 200 (H) 04/27/2023    CHOL 230 (H) 04/27/2023    LDLF 150 (H) 04/27/2023    HDL 39.8 (A) 04/27/2023     Urine Microalbumin  Lab Results   Component Value Date    MICROALBCREA 30 - 300 (A) 04/02/2024     Weight Management  Wt Readings from Last 3 Encounters:   10/15/24 95.3 kg (210 lb)   05/17/24 93 kg (205 lb)   05/09/24 93 kg (205 lb)      There is no height or weight on file to calculate BMI.     Assessment/Plan   Problem List Items Addressed This Visit       Diabetes mellitus with complication (Multi) - Primary     Patient's goal A1c is < 7.0%. Is pt at goal? No, most recent A1c on " "9/10/24 of 12.4%. Prior A1c was 11.6% on 4/2/24. Next A1c will be due after 12/10/24.        Rationale for plan: Patient's SMBGs were reviewed during visit today. TIR is below goal at 26%. Patient denies any occurrences of low BG readings. She has been adherent with CGM sensors and has been monitoring glucose. Patient reports that she has not bee adherent with Lantus or Mounjaro. States that she has been out of lantus for several days and is awaiting refill for lantus and pen needles. Chart review indicates that refills were sent to local [facility] pharmacy yesterday. Advised patient to  as soon as possible and restart insulin. Patient had previously reported missing several doses of Mounjaro 5 mg. She injected a dose last week and reports that it made her very ill. She has previously tolerated this dose and suspect that side effects are related to restarting higher dose after missing several doses. At this time, plan to restart titration of Mounjaro 2.5 mg weekly x4 weeks. Advised patient to continue other medications as prescribed. Reports that she struggles to remember doses and \"stay on track\". Plan to resume every 2 week follow up to support adherence to medications and assist with lifestyle changes. Next follow up in 2 weeks on 3/11/25.     Medication Changes:  CONTINUE:  Metformin 1000 mg once daily at bedtime  Lantus 54 units at bedtime  Jardiance 25 mg daily at bedtime   Mounjaro 5 mg once weekly    Future Considerations:   Mounjaro titration for BG and weight loss goals.      Monitoring and Education:  Counseled patient on Mounjaro MOA, expectations, side effects, duration of therapy, administration, and monitoring parameters.  Provided detailed dosing and administration counseling to ensure proper technique.   Reviewed Mounjaro titration schedule, starting with 2.5 mg once weekly to a goal of 15 mg once weekly if tolerated  Counseled patient on the benefits of GLP-1ra glycemic control and weight " loss  Reviewed storage requirements of Mounjaro when not in use, and when to administer the medication if a dose is missed.  Advised patient that they may experience improved satiety after meals and portion sizes of meals may be reduced as doses of Mounjaro increase.  Counseled patient on Jardiance MOA, expectations, side effects, duration of therapy, administration, and monitoring parameters.  Reviewed the benefits of SGLT-2i therapy, such as glycemic control and kidney and CV protection.  Advised patient to practice proper  hygiene to reduce risk of UTIs or yeast infections.  Advised patient to maintain adequate fluid intake to remain hydrated while on SGLT2i therapy.  Answered all patient questions and concerns.    Clinical Pharmacist follow-up: 3/11/25 @ 1340 am, Telehealth visit    Continue all meds under the continuation of care with the referring provider and clinical pharmacy team.    Thank you,  Chichi Moreno, PharmD  Clinical Pharmacist  [phone]    Verbal consent to manage patient's drug therapy was obtained from the patient. They were informed they may decline to participate or withdraw from participation in pharmacy services at any time.

## 2025-02-27 ENCOUNTER — PHARMACY VISIT (OUTPATIENT)
Dept: PHARMACY | Facility: CLINIC | Age: 65
End: 2025-02-27
Payer: MEDICAID

## 2025-03-03 ENCOUNTER — APPOINTMENT (OUTPATIENT)
Dept: SLEEP MEDICINE | Facility: CLINIC | Age: 65
End: 2025-03-03
Payer: COMMERCIAL

## 2025-03-09 DIAGNOSIS — E11.8 DIABETES MELLITUS WITH COMPLICATION (MULTI): ICD-10-CM

## 2025-03-11 ENCOUNTER — APPOINTMENT (OUTPATIENT)
Dept: PHARMACY | Facility: HOSPITAL | Age: 65
End: 2025-03-11
Payer: COMMERCIAL

## 2025-03-11 DIAGNOSIS — G25.81 RESTLESS LEGS SYNDROME: ICD-10-CM

## 2025-03-11 DIAGNOSIS — F41.9 ANXIETY DISORDER, UNSPECIFIED: ICD-10-CM

## 2025-03-11 DIAGNOSIS — I10 HYPERTENSION, UNSPECIFIED TYPE: ICD-10-CM

## 2025-03-11 DIAGNOSIS — E11.8 DIABETES MELLITUS WITH COMPLICATION (MULTI): ICD-10-CM

## 2025-03-11 RX ORDER — AMLODIPINE BESYLATE 2.5 MG/1
2.5 TABLET ORAL DAILY
Qty: 30 TABLET | Refills: 5 | Status: SHIPPED | OUTPATIENT
Start: 2025-03-11 | End: 2025-09-07

## 2025-03-11 RX ORDER — BLOOD-GLUCOSE SENSOR
EACH MISCELLANEOUS
Qty: 2 EACH | Refills: 11 | Status: SHIPPED | OUTPATIENT
Start: 2025-03-11

## 2025-03-11 RX ORDER — CLONAZEPAM 0.5 MG/1
0.5 TABLET ORAL NIGHTLY PRN
Qty: 30 TABLET | Refills: 0 | Status: SHIPPED | OUTPATIENT
Start: 2025-03-11

## 2025-03-11 NOTE — PROGRESS NOTES
Clinical Pharmacy Appointment    Patient ID: Mindy Dominguez is a 64 y.o. female who presents for diabetes.     This is a Follow Up appointment.     Referring Provider: Tammie Aldridge DO  PCP: Tammie Aldridge DO   Last visit with PCP: 1/21/25  Next visit with PCP: 5/22/25    Subjective     DIABETES MELLITUS TYPE II:    Known diabetic complications: neuropathy.  Does patient follow with Endocrinology: No  Last optometry exam: 10/11/24  Most recent visit in Podiatry: unknown-- patient denies sores or cuts on feet today       Current diabetic medications include:  Metformin XR 1000 mg once daily at bedtime    Jardiance 25 mg daily   Mounjaro 2.5 mg once weekly on Thursdays  Lantus 54 units at bedtime    Clarifications to regimen: Patient notes she has not yet restarted Mounjaro  Adverse Effects: None     Past diabetic medications  Glimepiride 4 mg twice daily (stopped due to low BG readings when starting Mounjaro)    Glucose Readings:  Glucometer/CGM Type: Freestyle Aida 3 plus with phone application  Sent invitation for Libreview - not yet accepted   Unavailable - patient reports all readings >200 mg/dL    Any episodes of hypoglycemia? No, none .  Did patient treat episode of hypoglycemia appropriately? N/A  Glucagon: No    Lifestyle:  Diet: trying to incorporate meal for breakfast. Trying to drink less sugar and eat fewer sugary snacks. Drinks diet pop. Purchased water packets with zero calories. Trying to incorporate more salads and vegetables. Eating more candy around [place]. Spaghetti with chickpea pasta. Ate 18 popsicles in 2 hours yesterday.  Physical activity: Limited    Primary/Secondary Prevention:   - Statin? Yes - Atorvastatin 80 mg daily   - ACE-I/ARB? No  - Aspirin? No     Pertinent PMH Review:  - PMH of Pancreatitis: No  - PMH of Retinopathy: No  - PMH of Urinary Tract Infections: Yes (remote history per patient)  - PMH of MTC: No  - PMH of ASCVD: Yes  - PMH of CKD: No  - PMH of HF: No    - PMH of Obesity: Yes     Medication Reconciliation:  No changes     Drug Interactions  No relevant drug interactions were noted.     Medication System Management  Patient's preferred pharmacy: [facility]  Adherence/Organization: Does not wake up early enough to take morning medications. Has struggled with adherence with twice daily medications.  Affordability/Accessibility: No issues     Objective   Allergies   Allergen Reactions    Amoxicillin-Pot Clavulanate Unknown    Lisinopril Unknown    Tetracycline Unknown     tongue swells    Sulfa (Sulfonamide Antibiotics) Hives and Rash     Social History     Social History Narrative    Not on file      Medication Review  Current Outpatient Medications   Medication Instructions    amLODIPine (NORVASC) 2.5 mg, oral, Daily    atorvastatin (LIPITOR) 80 mg, oral, Daily    blood-glucose sensor (FreeStyle Aida 3 Plus Sensor) device USE AS DIRECTED TO CHECK YOUR BLOOD AT LEAST 5 TIMES DAILY. CHANGE SENSOR EVERY 14 DAYS.    buPROPion (WELLBUTRIN) 100 mg, oral, Nightly    cholecalciferol (VITAMIN D-3) 2,000 Units, oral, Daily    clonazePAM (KLONOPIN) 0.5 mg, oral, Nightly PRN    clotrimazole-betamethasone (Lotrisone) cream 1 Application, Topical, 2 times daily    DULoxetine (CYMBALTA) 60 mg, oral, Daily, Do not crush or chew.    empagliflozin (JARDIANCE) 25 mg, oral, Daily    estrogens (conjugated) (PREMARIN) 0.5 g, vaginal, Daily, Take 1 gram nightly for 1 week and then take 1 gram on Monday, Wednesday, and Friday    FreeStyle Aida 3 [place] misc Use as instructed to check blood glucose at least 5 times daily.    FreeStyle Lite Strips strip 4 times daily    gabapentin enacarbil (Horizant) 600 mg tablet extended release ER tablet TAKE 1 TABLET BY MOUTH EVERY DAY WITH FOOD AT ABOUT 5PM    gemfibrozil (LOPID) 600 mg, oral, 2 times daily    insulin glargine (Lantus Solostar U-100 Insulin) 100 unit/mL (3 mL) pen INJECT 54 UNITS SUBCUTANEOUSLY AT BEDTIME AS DIRECTED     "levothyroxine (Synthroid, Levoxyl) 150 mcg tablet TAKE 1 TABLEY BY MOUTH ONCE DAILY, EXCEPT SATURDAYS & SUNDAYS TAKE 1/2 TAB    metFORMIN (Glucophage) 1,000 mg tablet TAKE 1 TABLET BY MOUTH TWICE A DAY WITH MEALS    Mounjaro 5 mg, subcutaneous, Weekly    Mounjaro 2.5 mg, subcutaneous, Weekly    pen needle, diabetic (BD [place] 2nd Gen Pen Needle) 32 gauge x 5/32\" needle USE AS INSTRUCTED TO INJECT INSULIN ONCE DAILY    rOPINIRole (REQUIP) 2 mg, oral, Nightly    SUMAtriptan (Imitrex) 50 mg tablet oral, TAKE 1 TABLET FOR MIGRAINE RELIEF. MAY REPEAT EVERY 2 HOURS. DO NOT TAKE MORE THAN 3 TABLETS IN 24 HOUR PERIOD      Vitals  BP Readings from Last 2 Encounters:   11/17/24 139/88   10/15/24 145/89     BMI Readings from Last 1 Encounters:   10/15/24 34.95 kg/m²      Labs  A1C  Lab Results   Component Value Date    HGBA1C 12.4 (H) 09/10/2024    HGBA1C 11.6 (A) 04/02/2024    HGBA1C 8.8 (A) 09/19/2023     BMP  Lab Results   Component Value Date    CALCIUM 9.6 09/19/2023     09/19/2023    K 4.3 09/19/2023    CO2 26 09/19/2023     09/19/2023    BUN 11 09/19/2023    CREATININE 0.78 09/19/2023     LFTs  Lab Results   Component Value Date    ALT 23 09/19/2023    AST 20 09/19/2023    ALKPHOS 104 09/19/2023    BILITOT 0.5 09/19/2023     FLP  Lab Results   Component Value Date    TRIG 200 (H) 04/27/2023    CHOL 230 (H) 04/27/2023    LDLF 150 (H) 04/27/2023    HDL 39.8 (A) 04/27/2023     Urine Microalbumin  Lab Results   Component Value Date    MICROALBCREA 30 - 300 (A) 04/02/2024     Weight Management  Wt Readings from Last 3 Encounters:   10/15/24 95.3 kg (210 lb)   05/17/24 93 kg (205 lb)   05/09/24 93 kg (205 lb)      There is no height or weight on file to calculate BMI.     Assessment/Plan   Problem List Items Addressed This Visit       Diabetes mellitus with complication (Multi)    Relevant Orders    Referral to Clinical Pharmacy     Patient's goal A1c is < 7.0%. Is pt at goal? No, most recent A1c on 9/10/24 of " 12.4%. Prior A1c was 11.6% on 4/2/24. Next A1c will be due after 12/10/24.        Rationale for plan: Patient's SMBGs were not reviewed during visit today. Patient reports that all readings are over 200 mg/dL and no lows reported. Also states that she has not yet restarted Mounjaro and is overdue with replacing CGM sensor. Patient reports that she has been adherent with Lantus, metformin and Jardiance. At this time, advised patient to restart Aida 3 plus sensors and Mounjaro 2.5 mg once weekly. Patient is in agreement. As glucose levels remain above goal, also advised to increase lantus to 58 units at bedtime. Will monitoring for low glucose readings. Patient endorses that she is under a lot of stress recently which is leading to unhealthy diet choices. Patient went to the grocery store yesterday and purchased healthy food options and is planning to cut back on sweets. Plan to continue every 2 week follow up to support adherence to medications and assist with lifestyle changes. Next follow up in 2 weeks on 3/25/25.     Medication Changes:  CONTINUE:  Metformin 1000 mg once daily at bedtime  Jardiance 25 mg daily at bedtime   INCREASE  Lantus 58 units at bedtime  RESTART  Mounjaro 2.5 mg once weekly  Freestyle Aida 3 plus sensors     Future Considerations:   Mounjaro titration for BG and weight loss goals.      Monitoring and Education:  Counseled patient on Mounjaro MOA, expectations, side effects, duration of therapy, administration, and monitoring parameters.  Provided detailed dosing and administration counseling to ensure proper technique.   Reviewed Mounjaro titration schedule, starting with 2.5 mg once weekly to a goal of 15 mg once weekly if tolerated  Counseled patient on the benefits of GLP-1ra glycemic control and weight loss  Reviewed storage requirements of Mounjaro when not in use, and when to administer the medication if a dose is missed.  Advised patient that they may experience improved satiety  after meals and portion sizes of meals may be reduced as doses of Mounjaro increase.  Counseled patient on Jardiance MOA, expectations, side effects, duration of therapy, administration, and monitoring parameters.  Reviewed the benefits of SGLT-2i therapy, such as glycemic control and kidney and CV protection.  Advised patient to practice proper  hygiene to reduce risk of UTIs or yeast infections.  Advised patient to maintain adequate fluid intake to remain hydrated while on SGLT2i therapy.  Answered all patient questions and concerns.    Clinical Pharmacist follow-up: 3/25/25 @ 1240 am, Telehealth visit    Continue all meds under the continuation of care with the referring provider and clinical pharmacy team.    Thank you,  Chichi Moreno, PharmD  Clinical Pharmacist  [phone]    Verbal consent to manage patient's drug therapy was obtained from the patient. They were informed they may decline to participate or withdraw from participation in pharmacy services at any time.

## 2025-03-11 NOTE — TELEPHONE ENCOUNTER
Medication: klonopin  -CSA: 10/24   -UDS: none  -Last appt: 1/25  -Next appt date: 5/22/25   [Noncontributory] : The patient's family history was noncontributory [Unlimited ADLs] : able to do activities of daily living without limitations [Unlimited Sports] : able to participate in sports without limitations [FreeTextEntry1] : Jose is a 17-year-old male who presents for evaluation of joint pain.   Father reports that Jose developed hip pain when he was about 10 years old - he had orthopedic evaluation done that that time that was negative. Jose reports that he has continued to have intermittent bilateral hip pain (R >> L). Pain comes and goes and is sometimes related to activity but sometimes occur without activity. He also has a history of knee pain that was attributed to Osgood Schlatter in the past and more recently with patellofemoral syndrome of both knees and has done PT multiple times with improvement in pain.   Father reports that Jose is very active and plays basketball and baseball for his school team. He was away in [facility] this summer and was playing soccer a lot and he started to experience pain in his hips and knees. When he came home from [facility], father reports that he started to walking 'funny.' No joint swelling, limitation, or AM stiffness. He was seen by physiatry - x-rays overall reassuring and was advised to start PT. Jose is doing PT twice a week with improvement in pain and he is stretching before and after sports practice. He still notes pain and reports feeling 'stiff' and needs to take Advil PRN before practice. Pain does not stop him from practices.   Occasionally has some pain in shoulders and calves, but this is mainly activity related. He had some shoulder pain when carrying a bag of items that dad felt was not very heavy.   No fever, headache, visual changes, mouth sores, cough, congestion, chest pain, difficulty breathing, nausea, vomiting, diarrhea, constipation, blood in the stool, abdominal pain, dysuria, hematuria, joint swelling, morning stiffness, back pain, or rash.   Past Medical History: None Past Surgical History: None Family History: Father undergoing w/u for RA  Social History: 11th grade. Lives with parents. Brother away at college.  Medications: Advil PRN Allergies: NKDA, cats, pollen

## 2025-03-13 NOTE — PROGRESS NOTES
"Subjective   Patient ID: Mindy Dominguez is a 64 y.o. female who presents for Sick Visit (Burning pain, all over body, no \"sick\" sxs, just pain all over body, fell about a week before pain started).    Body pain: She had a really rough winter, she tried higher dose mounjaro on her own and got really sick (nausea and vomiting) about 5 weeks ago. She got over that and hasn't gone back on. She fell about a week and a half ago and hurt her shoulder but that has gotten better since then. About a week ago she started having aching pain all over her body. She feels that she is getting sick. She actually has a long history of body aches that she called \"autoimmune pain\" since she was 10. She feels that but significantly more intense. She is taking otc pain meds without relief. She is getting brain fog and instability with walking. She also had a massage at [facility] [employer] last week, she thinks it could have been activated by her massage. Unable to check her temperature. No respiratory infection symptoms. No headaches. There is concern about Lyme's disease because there are lots of dogs and ticks from dogs. No rashes. The back of her hand has an itching rash. No vomiting, diarrhea, or abdominal pain. Blood sugars have been \"a mess.\" She has had shingles around 6 times in the past, there are similar elements in her symptoms to her shingles outbreaks.     Diabetes: She is current on extended release metformin (now once daily), lantus 54u QHS, glimepiride bid. She is on mounjaro now and feels it is helping. She is using the michael, it is helping her more now. She is working with the  pharmacist.      Skin itching: She gets a rash under her breasts that itches. She uses      HTN: She is on amlodipine, no SE's.      Depression and anxiety, panic attacks: Not seeing psych, she does follow with a counsellor. She has had some workup for chronic faitgue. She did notice problems with mental health off the cymbalta. Anxiety and " "depression were worse than normal. She is on wellbutrin for the last 9 months and she feels it is helping with the depression symptoms. She is using the klonopin prn rarely for stressful situations and insomnia.      Peripheral neuropathy, RLS: 2/2 DM. On cymbalta and gabapentin. She takes ropinirole as well. She follows with Dr. Leary. Her RLS symptoms are actually full body.      Hypothyroidism: On replacement.     HLD: On atorvastatin and gemfibrozil. No cardiac history. She has had calcium score about 2 years ago that was clear.      Migraines: Doing ok with prn sumatriptan. Hasn't had any in the last few years. Triggered by exertion and high temperatures.      Atrophic vaginitis: She has premarin cream from gyn (Dr. Morrison) but hasn't used it. No recent episodes of bleeding.      MIKAYLA: She started CPAP 15y ago. Within the last 4 years she was extremely compliant. She has lost about 90lb in this same time period and she believes the problem is she needs her pressure changed. Machine is through [facility].      She had a large surgery on right arm in 2010, the olecranon surgery failed and she deals with chronic right pinky finger pain (Dr. Mays).      Review of systems completed and unremarkable other than what is documented in HPI.    Objective   Ht 1.651 m (5' 5\")   Wt 88 kg (194 lb)   BMI 32.28 kg/m²     Gen: No acute distress, alert and oriented x3, pleasant   HEENT: moist mucous membranes, b/l external auditory canals are clear of debris, TMs within normal limits, no oropharyngeal lesions, eomi, perrla   Neck: thyroid within normal limits, no lymphadenopathy   CV: RRR, normal S1/S2, no murmur   Resp: Clear to auscultation bilaterally, no wheezes or rhonchi appreciated  Abd: soft, nontender, non-distended, no guarding/rigidity, bowel sounds present  Extr: no edema, no calf tenderness  Derm: Skin is warm and dry, no rashes appreciated  Psych: mood is good, affect is congruent, good hygiene, normal speech " and eye contact  Neuro: cranial nerves grossly intact, normal gait    No data recorded        Assessment/Plan   #Body aches  Check labs  Check urine  Check flu and COVID  Rx sent valtrex to cover for shingles  Rx sent medrol pack    #Diabetes mellitus:  On metformin xr, jardiance, lantus 54u  On mounjaro     #Rash  Rx sent combo cream     #HTN:   Reasonable control on amlodipine     #Depression and anxiety  #Panic attacks:  #Insomnia:  Following with counsellor only  Doing well on wellbutrin and cymbalta  Has klonopin prn for insomnia  CSA signed, OARRS reviewed     #Peripheral neuropathy  #RLS:   2/2 DM  On cymbalta, gabapentin, and ropinirole  Following with podiatry for care     #Hypothyroidism:   Controlled on replacement     #HLD:   Controlled on atorvastatin and gemfibrozil     #Migraines:   Resolved  Has sumatriptan for rescue     #Atrophic vaginitis:   She has premarin cream from gyn (Dr. Morrison)  No recent episodes of bleeding      #MIKAYLA:   She started CPAP 15y ago  Ordering new sleep study     HCM  Mammogram May negative  Flu vaccine today  UTD for COVID vaccines  She is well overdue for C-scope, we will set up in Summer, she has seen Ernie in the distant

## 2025-03-14 ENCOUNTER — OFFICE VISIT (OUTPATIENT)
Dept: PRIMARY CARE | Facility: CLINIC | Age: 65
End: 2025-03-14
Payer: COMMERCIAL

## 2025-03-14 VITALS
SYSTOLIC BLOOD PRESSURE: 123 MMHG | DIASTOLIC BLOOD PRESSURE: 84 MMHG | HEIGHT: 65 IN | HEART RATE: 88 BPM | WEIGHT: 194 LBS | BODY MASS INDEX: 32.32 KG/M2

## 2025-03-14 DIAGNOSIS — J06.9 UPPER RESPIRATORY TRACT INFECTION, UNSPECIFIED TYPE: ICD-10-CM

## 2025-03-14 DIAGNOSIS — R52 BODY ACHES: Primary | ICD-10-CM

## 2025-03-14 DIAGNOSIS — R39.9 URINARY SYMPTOM OR SIGN: ICD-10-CM

## 2025-03-14 LAB
POC APPEARANCE, URINE: CLEAR
POC BILIRUBIN, URINE: NEGATIVE
POC BLOOD, URINE: NEGATIVE
POC COLOR, URINE: ABNORMAL
POC GLUCOSE, URINE: ABNORMAL MG/DL
POC KETONES, URINE: NEGATIVE MG/DL
POC LEUKOCYTES, URINE: NEGATIVE
POC NITRITE,URINE: NEGATIVE
POC PH, URINE: 5 PH
POC PROTEIN, URINE: NEGATIVE MG/DL
POC RAPID INFLUENZA A: NEGATIVE
POC RAPID INFLUENZA B: NEGATIVE
POC SARS-COV-2 AG BINAX: NORMAL
POC SPECIFIC GRAVITY, URINE: 1.02
POC UROBILINOGEN, URINE: 0.2 EU/DL

## 2025-03-14 PROCEDURE — 87811 SARS-COV-2 COVID19 W/OPTIC: CPT | Performed by: FAMILY MEDICINE

## 2025-03-14 PROCEDURE — 1036F TOBACCO NON-USER: CPT | Performed by: FAMILY MEDICINE

## 2025-03-14 PROCEDURE — 81003 URINALYSIS AUTO W/O SCOPE: CPT | Performed by: FAMILY MEDICINE

## 2025-03-14 PROCEDURE — 99214 OFFICE O/P EST MOD 30 MIN: CPT | Performed by: FAMILY MEDICINE

## 2025-03-14 PROCEDURE — 87804 INFLUENZA ASSAY W/OPTIC: CPT | Performed by: FAMILY MEDICINE

## 2025-03-14 PROCEDURE — 3074F SYST BP LT 130 MM HG: CPT | Performed by: FAMILY MEDICINE

## 2025-03-14 PROCEDURE — 3079F DIAST BP 80-89 MM HG: CPT | Performed by: FAMILY MEDICINE

## 2025-03-14 PROCEDURE — 3008F BODY MASS INDEX DOCD: CPT | Performed by: FAMILY MEDICINE

## 2025-03-14 RX ORDER — METHYLPREDNISOLONE 4 MG/1
TABLET ORAL
Qty: 21 TABLET | Refills: 0 | Status: SHIPPED | OUTPATIENT
Start: 2025-03-14 | End: 2025-03-20

## 2025-03-14 RX ORDER — VALACYCLOVIR HYDROCHLORIDE 1 G/1
1000 TABLET, FILM COATED ORAL 2 TIMES DAILY
Qty: 14 TABLET | Refills: 0 | Status: SHIPPED | OUTPATIENT
Start: 2025-03-14 | End: 2025-03-21

## 2025-03-14 ASSESSMENT — PAIN SCALES - GENERAL: PAINLEVEL_OUTOF10: 6

## 2025-03-15 LAB
ALBUMIN SERPL-MCNC: 4.5 G/DL (ref 3.6–5.1)
ALP SERPL-CCNC: 116 U/L (ref 37–153)
ALT SERPL-CCNC: 24 U/L (ref 6–29)
ANA SER QL IF: NORMAL
ANION GAP SERPL CALCULATED.4IONS-SCNC: 13 MMOL/L (CALC) (ref 7–17)
AST SERPL-CCNC: 14 U/L (ref 10–35)
B BURGDOR DNA SPEC QL NAA+PROBE: NORMAL
BASOPHILS # BLD AUTO: 51 CELLS/UL (ref 0–200)
BASOPHILS NFR BLD AUTO: 0.9 %
BILIRUB SERPL-MCNC: 0.4 MG/DL (ref 0.2–1.2)
BUN SERPL-MCNC: 22 MG/DL (ref 7–25)
CALCIUM SERPL-MCNC: 10 MG/DL (ref 8.6–10.4)
CHLORIDE SERPL-SCNC: 107 MMOL/L (ref 98–110)
CO2 SERPL-SCNC: 21 MMOL/L (ref 20–32)
CREAT SERPL-MCNC: 0.82 MG/DL (ref 0.5–1.05)
CRP SERPL-MCNC: NORMAL MG/L
EGFRCR SERPLBLD CKD-EPI 2021: 80 ML/MIN/1.73M2
ENA SM+RNP AB SER IA-ACNC: NORMAL
EOSINOPHIL # BLD AUTO: 342 CELLS/UL (ref 15–500)
EOSINOPHIL NFR BLD AUTO: 6 %
ERYTHROCYTE [DISTWIDTH] IN BLOOD BY AUTOMATED COUNT: 13.5 % (ref 11–15)
ERYTHROCYTE [SEDIMENTATION RATE] IN BLOOD BY WESTERGREN METHOD: 22 MM/H
EST. AVERAGE GLUCOSE BLD GHB EST-MCNC: 275 MG/DL
EST. AVERAGE GLUCOSE BLD GHB EST-SCNC: 15.2 MMOL/L
GLUCOSE SERPL-MCNC: 207 MG/DL (ref 65–139)
HBA1C MFR BLD: 11.2 % OF TOTAL HGB
HCT VFR BLD AUTO: 44.6 % (ref 35–45)
HGB BLD-MCNC: 14.6 G/DL (ref 11.7–15.5)
LYMPHOCYTES # BLD AUTO: 1989 CELLS/UL (ref 850–3900)
LYMPHOCYTES NFR BLD AUTO: 34.9 %
MCH RBC QN AUTO: 28.1 PG (ref 27–33)
MCHC RBC AUTO-ENTMCNC: 32.7 G/DL (ref 32–36)
MCV RBC AUTO: 85.8 FL (ref 80–100)
MONOCYTES # BLD AUTO: 519 CELLS/UL (ref 200–950)
MONOCYTES NFR BLD AUTO: 9.1 %
NEUTROPHILS # BLD AUTO: 2799 CELLS/UL (ref 1500–7800)
NEUTROPHILS NFR BLD AUTO: 49.1 %
PLATELET # BLD AUTO: 344 THOUSAND/UL (ref 140–400)
PMV BLD REES-ECKER: 10.3 FL (ref 7.5–12.5)
POTASSIUM SERPL-SCNC: 4.4 MMOL/L (ref 3.5–5.3)
PROT SERPL-MCNC: 7.5 G/DL (ref 6.1–8.1)
RBC # BLD AUTO: 5.2 MILLION/UL (ref 3.8–5.1)
SERVICE CMNT-IMP: NORMAL
SODIUM SERPL-SCNC: 141 MMOL/L (ref 135–146)
SPECIMEN SOURCE: NORMAL
VZV IGG SER IA-ACNC: NORMAL
VZV IGM SER IA-ACNC: NORMAL
WBC # BLD AUTO: 5.7 THOUSAND/UL (ref 3.8–10.8)

## 2025-03-16 LAB — BACTERIA UR CULT: NORMAL

## 2025-03-18 ENCOUNTER — TELEPHONE (OUTPATIENT)
Dept: PRIMARY CARE | Facility: CLINIC | Age: 65
End: 2025-03-18
Payer: COMMERCIAL

## 2025-03-18 LAB
ALBUMIN SERPL-MCNC: 4.5 G/DL (ref 3.6–5.1)
ALP SERPL-CCNC: 116 U/L (ref 37–153)
ALT SERPL-CCNC: 24 U/L (ref 6–29)
ANA SER QL IF: NEGATIVE
ANION GAP SERPL CALCULATED.4IONS-SCNC: 13 MMOL/L (CALC) (ref 7–17)
AST SERPL-CCNC: 14 U/L (ref 10–35)
B BURGDOR DNA SPEC QL NAA+PROBE: NOT DETECTED
BASOPHILS # BLD AUTO: 51 CELLS/UL (ref 0–200)
BASOPHILS NFR BLD AUTO: 0.9 %
BILIRUB SERPL-MCNC: 0.4 MG/DL (ref 0.2–1.2)
BUN SERPL-MCNC: 22 MG/DL (ref 7–25)
CALCIUM SERPL-MCNC: 10 MG/DL (ref 8.6–10.4)
CHLORIDE SERPL-SCNC: 107 MMOL/L (ref 98–110)
CO2 SERPL-SCNC: 21 MMOL/L (ref 20–32)
CREAT SERPL-MCNC: 0.82 MG/DL (ref 0.5–1.05)
CRP SERPL-MCNC: <3 MG/L
EGFRCR SERPLBLD CKD-EPI 2021: 80 ML/MIN/1.73M2
ENA SM+RNP AB SER IA-ACNC: NORMAL AI
EOSINOPHIL # BLD AUTO: 342 CELLS/UL (ref 15–500)
EOSINOPHIL NFR BLD AUTO: 6 %
ERYTHROCYTE [DISTWIDTH] IN BLOOD BY AUTOMATED COUNT: 13.5 % (ref 11–15)
ERYTHROCYTE [SEDIMENTATION RATE] IN BLOOD BY WESTERGREN METHOD: 22 MM/H
EST. AVERAGE GLUCOSE BLD GHB EST-MCNC: 275 MG/DL
EST. AVERAGE GLUCOSE BLD GHB EST-SCNC: 15.2 MMOL/L
GLUCOSE SERPL-MCNC: 207 MG/DL (ref 65–139)
HBA1C MFR BLD: 11.2 % OF TOTAL HGB
HCT VFR BLD AUTO: 44.6 % (ref 35–45)
HGB BLD-MCNC: 14.6 G/DL (ref 11.7–15.5)
LYMPHOCYTES # BLD AUTO: 1989 CELLS/UL (ref 850–3900)
LYMPHOCYTES NFR BLD AUTO: 34.9 %
MCH RBC QN AUTO: 28.1 PG (ref 27–33)
MCHC RBC AUTO-ENTMCNC: 32.7 G/DL (ref 32–36)
MCV RBC AUTO: 85.8 FL (ref 80–100)
MONOCYTES # BLD AUTO: 519 CELLS/UL (ref 200–950)
MONOCYTES NFR BLD AUTO: 9.1 %
NEUTROPHILS # BLD AUTO: 2799 CELLS/UL (ref 1500–7800)
NEUTROPHILS NFR BLD AUTO: 49.1 %
PLATELET # BLD AUTO: 344 THOUSAND/UL (ref 140–400)
PMV BLD REES-ECKER: 10.3 FL (ref 7.5–12.5)
POTASSIUM SERPL-SCNC: 4.4 MMOL/L (ref 3.5–5.3)
PROT SERPL-MCNC: 7.5 G/DL (ref 6.1–8.1)
RBC # BLD AUTO: 5.2 MILLION/UL (ref 3.8–5.1)
SERVICE CMNT-IMP: NORMAL
SODIUM SERPL-SCNC: 141 MMOL/L (ref 135–146)
SPECIMEN SOURCE: NORMAL
VZV IGG SER IA-ACNC: 13.7 S/CO
VZV IGM SER IA-ACNC: <=0.9
WBC # BLD AUTO: 5.7 THOUSAND/UL (ref 3.8–10.8)

## 2025-03-18 NOTE — TELEPHONE ENCOUNTER
Pt called in stating she would like something for the pain. The anti-viral does not seem to be helping so she stopped it. She is taking the prednisone. She also forgot to tell you that she believes she was going with draw like symptoms as she stopped her cymbalta by accident. She has started it back up now though.

## 2025-03-18 NOTE — TELEPHONE ENCOUNTER
Ok. I do think her symptoms could be from cymbalta withdrawal. That tends to respond well to ativan. I had discussed a stronger pain med when I saw her (tramadol or oxycodone) and that is still an option but I'd like to offer her EITHER the ativan or the pain pill and I will send the one she chooses. Also her labs came back normal, apart from A1c of 11.

## 2025-03-19 DIAGNOSIS — R52 BODY ACHES: Primary | ICD-10-CM

## 2025-03-19 RX ORDER — TRAMADOL HYDROCHLORIDE 50 MG/1
50 TABLET ORAL EVERY 6 HOURS PRN
Qty: 15 TABLET | Refills: 0 | Status: SHIPPED | OUTPATIENT
Start: 2025-03-19 | End: 2025-03-26

## 2025-03-20 DIAGNOSIS — Z00.00 ENCOUNTER FOR GENERAL ADULT MEDICAL EXAMINATION WITHOUT ABNORMAL FINDINGS: ICD-10-CM

## 2025-03-20 RX ORDER — ATORVASTATIN CALCIUM 80 MG/1
80 TABLET, FILM COATED ORAL DAILY
Qty: 90 TABLET | Refills: 3 | Status: SHIPPED | OUTPATIENT
Start: 2025-03-20

## 2025-03-25 ENCOUNTER — APPOINTMENT (OUTPATIENT)
Dept: PHARMACY | Facility: HOSPITAL | Age: 65
End: 2025-03-25
Payer: COMMERCIAL

## 2025-03-25 DIAGNOSIS — E11.8 DIABETES MELLITUS WITH COMPLICATION (MULTI): ICD-10-CM

## 2025-03-25 NOTE — PROGRESS NOTES
Clinical Pharmacy Appointment    Patient ID: Mindy Dominguez is a 64 y.o. female who presents for diabetes.     This is a Follow Up appointment.     Referring Provider: Tammie Aldridge DO  PCP: Tammie Aldridge DO   Last visit with PCP: 1/21/25  Next visit with PCP: 5/22/25    Subjective     DIABETES MELLITUS TYPE II:    Known diabetic complications: neuropathy.  Does patient follow with Endocrinology: No  Last optometry exam: 10/11/24  Most recent visit in Podiatry: unknown-- patient denies sores or cuts on feet today       Current diabetic medications include:  Metformin XR 1000 mg once daily at bedtime    Jardiance 25 mg daily   Mounjaro 2.5 mg once weekly on Thursdays  Lantus 60 units at bedtime    Clarifications to regimen: Patient notes she has not yet restarted Mounjaro  Adverse Effects: None     Past diabetic medications  Glimepiride 4 mg twice daily (stopped due to low BG readings when starting Mounjaro)    Glucose Readings:  Glucometer/CGM Type: Freestyle Aida 3 plus with phone application  Sent invitation for Libreview - not yet accepted     7 day:   TAR: 21% + 73%  TIR: 6%  TBR: 0%    Any episodes of hypoglycemia? No, none .  Did patient treat episode of hypoglycemia appropriately? N/A  Glucagon: No    Lifestyle:  Diet: trying to incorporate meal for breakfast. Trying to drink less sugar and eat fewer sugary snacks. Drinks diet pop. Purchased water packets with zero calories. Trying to incorporate more salads and vegetables. Eating more candy around [place]. Spaghetti with chickpea pasta. Ate 18 popsicles in 2 hours yesterday. Eating a lot of sugar recently.   Physical activity: Limited    Primary/Secondary Prevention:   - Statin? Yes - Atorvastatin 80 mg daily   - ACE-I/ARB? No  - Aspirin? No     Pertinent PMH Review:  - PMH of Pancreatitis: No  - PMH of Retinopathy: No  - PMH of Urinary Tract Infections: Yes (remote history per patient)  - PMH of MTC: No  - PMH of ASCVD: Yes  - PMH of CKD:  No  - PMH of HF: No   - PMH of Obesity: Yes     Medication Reconciliation:  No changes     Drug Interactions  No relevant drug interactions were noted.     Medication System Management  Patient's preferred pharmacy: [facility]  Adherence/Organization: Does not wake up early enough to take morning medications. Has struggled with adherence with twice daily medications.  Affordability/Accessibility: No issues     Objective   Allergies   Allergen Reactions    Amoxicillin-Pot Clavulanate Unknown    Lisinopril Unknown    Tetracycline Unknown     tongue swells    Sulfa (Sulfonamide Antibiotics) Hives and Rash     Social History     Social History Narrative    Not on file      Medication Review  Current Outpatient Medications   Medication Instructions    amLODIPine (NORVASC) 2.5 mg, oral, Daily    atorvastatin (LIPITOR) 80 mg, oral, Daily    blood-glucose sensor (FreeStyle Aida 3 Plus Sensor) device USE AS DIRECTED TO CHECK YOUR BLOOD AT LEAST 5 TIMES DAILY. CHANGE SENSOR EVERY 14 DAYS.    buPROPion (WELLBUTRIN) 100 mg, oral, Nightly    cholecalciferol (VITAMIN D-3) 2,000 Units, Daily    clonazePAM (KLONOPIN) 0.5 mg, oral, Nightly PRN    DULoxetine (CYMBALTA) 60 mg, oral, Daily, Do not crush or chew.    empagliflozin (JARDIANCE) 25 mg, oral, Daily    FreeStyle Aida 3 [place] misc Use as instructed to check blood glucose at least 5 times daily.    FreeStyle Lite Strips strip 4 times daily    gabapentin enacarbil (Horizant) 600 mg tablet extended release ER tablet TAKE 1 TABLET BY MOUTH EVERY DAY WITH FOOD AT ABOUT 5PM    gemfibrozil (LOPID) 600 mg, oral, 2 times daily    insulin glargine (Lantus Solostar U-100 Insulin) 100 unit/mL (3 mL) pen INJECT 54 UNITS SUBCUTANEOUSLY AT BEDTIME AS DIRECTED    levothyroxine (Synthroid, Levoxyl) 150 mcg tablet TAKE 1 TABLEY BY MOUTH ONCE DAILY, EXCEPT SATURDAYS & SUNDAYS TAKE 1/2 TAB    metFORMIN (Glucophage) 1,000 mg tablet TAKE 1 TABLET BY MOUTH TWICE A DAY WITH MEALS    Mounjaro 5  "mg, subcutaneous, Weekly    Mounjaro 2.5 mg, subcutaneous, Weekly    pen needle, diabetic (BD [place] 2nd Gen Pen Needle) 32 gauge x 5/32\" needle USE AS INSTRUCTED TO INJECT INSULIN ONCE DAILY    rOPINIRole (REQUIP) 2 mg, oral, Nightly    SUMAtriptan (Imitrex) 50 mg tablet Take by mouth. TAKE 1 TABLET FOR MIGRAINE RELIEF. MAY REPEAT EVERY 2 HOURS. DO NOT TAKE MORE THAN 3 TABLETS IN 24 HOUR PERIOD    traMADol (ULTRAM) 50 mg, oral, Every 6 hours PRN      Vitals  BP Readings from Last 2 Encounters:   03/14/25 123/84   11/17/24 139/88     BMI Readings from Last 1 Encounters:   03/14/25 32.28 kg/m²      Labs  A1C  Lab Results   Component Value Date    HGBA1C 11.2 (H) 03/14/2025    HGBA1C 12.4 (H) 09/10/2024    HGBA1C 11.6 (A) 04/02/2024     BMP  Lab Results   Component Value Date    CALCIUM 10.0 03/14/2025     03/14/2025    K 4.4 03/14/2025    CO2 21 03/14/2025     03/14/2025    BUN 22 03/14/2025    CREATININE 0.82 03/14/2025    EGFR 80 03/14/2025     LFTs  Lab Results   Component Value Date    ALT 24 03/14/2025    AST 14 03/14/2025    ALKPHOS 116 03/14/2025    BILITOT 0.4 03/14/2025     FLP  Lab Results   Component Value Date    TRIG 200 (H) 04/27/2023    CHOL 230 (H) 04/27/2023    LDLF 150 (H) 04/27/2023    HDL 39.8 (A) 04/27/2023     Urine Microalbumin  Lab Results   Component Value Date    MICROALBCREA 30 - 300 (A) 04/02/2024     Weight Management  Wt Readings from Last 3 Encounters:   03/14/25 88 kg (194 lb)   10/15/24 95.3 kg (210 lb)   05/17/24 93 kg (205 lb)      There is no height or weight on file to calculate BMI.     Assessment/Plan   Problem List Items Addressed This Visit       Diabetes mellitus with complication (Multi)    Relevant Orders    Referral to Clinical Pharmacy     Patient's goal A1c is < 7.0%. Is pt at goal? No, most recent A1c on 9/10/24 of 12.4%. Prior A1c was 11.6% on 4/2/24. Next A1c will be due after 12/10/24.        Rationale for plan: Patient checks BG continuously with Freestyle " Aida 3 plus sensors and application. Patient's SMBGs are above goal nearly all of the time. TAR 94% and TIR of 6%. Patient denies any low BG events. Current DM regimen includes Metformin 1000 mg once daily at bedtime, Jardiance 25 mg daily at bedtime, and Lantus 60 units at bedtime. Discussed that current glucose control is not sufficient and discussed glucose lowering options. Patient reports that she is apprehensive to restart Mounjaro due to occasional nausea. Discussed that restarting on the lowest dose reduces the risk of nausea and patient tolerated lowest dose well previously. Patient is agreeable to restarting at this time and will notify pharmacist if symptoms arise. If Mounjaro is not restarted or is intolerable, insulin doses will need to be increased further. Could also consider restarting glimepiride versus adding meal time insulin. Plan to follow up in 1 month to review BG readings and determine need for medication adjustments.     Medication Changes:  CONTINUE:  Metformin 1000 mg once daily at bedtime  Jardiance 25 mg daily at bedtime   Lantus 60 units at bedtime  Freestyle Aida 3 plus sensors   RESTART  Mounjaro 2.5 mg once weekly    Future Considerations:   Mounjaro titration for BG and weight loss goals.      Monitoring and Education:  Counseled patient on Mounjaro MOA, expectations, side effects, duration of therapy, administration, and monitoring parameters.  Provided detailed dosing and administration counseling to ensure proper technique.   Reviewed Mounjaro titration schedule, starting with 2.5 mg once weekly to a goal of 15 mg once weekly if tolerated  Counseled patient on the benefits of GLP-1ra glycemic control and weight loss  Reviewed storage requirements of Mounjaro when not in use, and when to administer the medication if a dose is missed.  Advised patient that they may experience improved satiety after meals and portion sizes of meals may be reduced as doses of Mounjaro  increase.  Counseled patient on Jardiance MOA, expectations, side effects, duration of therapy, administration, and monitoring parameters.  Reviewed the benefits of SGLT-2i therapy, such as glycemic control and kidney and CV protection.  Advised patient to practice proper  hygiene to reduce risk of UTIs or yeast infections.  Advised patient to maintain adequate fluid intake to remain hydrated while on SGLT2i therapy.  Answered all patient questions and concerns.    Clinical Pharmacist follow-up: 4/15/25 @ 1300 am, Telehealth visit    Continue all meds under the continuation of care with the referring provider and clinical pharmacy team.    Thank you,  Chichi Moreno, PharmD  Clinical Pharmacist  [phone]    Verbal consent to manage patient's drug therapy was obtained from the patient. They were informed they may decline to participate or withdraw from participation in pharmacy services at any time.

## 2025-04-11 ENCOUNTER — TELEPHONE (OUTPATIENT)
Dept: PRIMARY CARE | Facility: CLINIC | Age: 65
End: 2025-04-11
Payer: COMMERCIAL

## 2025-04-11 DIAGNOSIS — R52 BODY ACHES: ICD-10-CM

## 2025-04-11 RX ORDER — TRAMADOL HYDROCHLORIDE 50 MG/1
TABLET ORAL
COMMUNITY
End: 2025-04-11 | Stop reason: SDUPTHER

## 2025-04-11 RX ORDER — TRAMADOL HYDROCHLORIDE 50 MG/1
50 TABLET ORAL EVERY 6 HOURS PRN
Qty: 15 TABLET | Refills: 0 | Status: SHIPPED | OUTPATIENT
Start: 2025-04-11

## 2025-04-15 ENCOUNTER — APPOINTMENT (OUTPATIENT)
Dept: PHARMACY | Facility: HOSPITAL | Age: 65
End: 2025-04-15
Payer: COMMERCIAL

## 2025-05-06 ENCOUNTER — APPOINTMENT (OUTPATIENT)
Dept: PHARMACY | Facility: HOSPITAL | Age: 65
End: 2025-05-06
Payer: COMMERCIAL

## 2025-05-13 ENCOUNTER — ANESTHESIA EVENT (OUTPATIENT)
Dept: GASTROENTEROLOGY | Facility: HOSPITAL | Age: 65
End: 2025-05-13
Payer: COMMERCIAL

## 2025-05-13 ENCOUNTER — PRE-ADMISSION TESTING (OUTPATIENT)
Dept: PREADMISSION TESTING | Facility: HOSPITAL | Age: 65
End: 2025-05-13
Payer: COMMERCIAL

## 2025-05-13 VITALS — WEIGHT: 195 LBS | HEIGHT: 65 IN | BODY MASS INDEX: 32.49 KG/M2

## 2025-05-13 DIAGNOSIS — Z12.11 SPECIAL SCREENING FOR MALIGNANT NEOPLASMS, COLON: ICD-10-CM

## 2025-05-13 PROCEDURE — RXMED WILLOW AMBULATORY MEDICATION CHARGE

## 2025-05-13 RX ORDER — SODIUM PICOSULFATE, MAGNESIUM OXIDE, AND ANHYDROUS CITRIC ACID 12; 3.5; 1 G/175ML; G/175ML; MG/175ML
LIQUID ORAL
Qty: 350 ML | Refills: 0 | Status: SHIPPED | OUTPATIENT
Start: 2025-05-13

## 2025-05-13 ASSESSMENT — DUKE ACTIVITY SCORE INDEX (DASI)
CAN YOU CLIMB A FLIGHT OF STAIRS OR WALK UP A HILL: YES
CAN YOU WALK A BLOCK OR TWO ON LEVEL GROUND: YES
CAN YOU DO YARD WORK LIKE RAKING LEAVES, WEEDING OR PUSHING A MOWER: NO
CAN YOU DO MODERATE WORK AROUND THE HOUSE LIKE VACUUMING, SWEEPING FLOORS OR CARRYING GROCERIES: YES
CAN YOU RUN A SHORT DISTANCE: NO
CAN YOU DO HEAVY WORK AROUND THE HOUSE LIKE SCRUBBING FLOORS OR LIFTING AND MOVING HEAVY FURNITURE: NO
CAN YOU TAKE CARE OF YOURSELF (EAT, DRESS, BATHE, OR USE TOILET): YES
CAN YOU PARTICIPATE IN STRENOUS SPORTS LIKE SWIMMING, SINGLES TENNIS, FOOTBALL, BASKETBALL, OR SKIING: NO
CAN YOU DO LIGHT WORK AROUND THE HOUSE LIKE DUSTING OR WASHING DISHES: YES
CAN YOU PARTICIPATE IN MODERATE RECREATIONAL ACTIVITIES LIKE GOLF, BOWLING, DANCING, DOUBLES TENNIS OR THROWING A BASEBALL OR FOOTBALL: NO
CAN YOU WALK INDOORS, SUCH AS AROUND YOUR HOUSE: YES

## 2025-05-13 NOTE — PREPROCEDURE INSTRUCTIONS
Medication List            Accurate as of May 13, 2025  9:07 AM. Always use your most recent med list.                amLODIPine 2.5 mg tablet  Commonly known as: Norvasc  Take 1 tablet (2.5 mg) by mouth once daily.  Medication Adjustments for Surgery: Take on the morning of surgery     atorvastatin 80 mg tablet  Commonly known as: Lipitor  TAKE 1 TABLET BY MOUTH EVERY DAY  Medication Adjustments for Surgery: Take last dose 1 day (24 hours) before surgery     buPROPion 100 mg tablet  Commonly known as: Wellbutrin  TAKE 1 TABLET BY MOUTH EVERYDAY AT BEDTIME  Medication Adjustments for Surgery: Take on the morning of surgery     clonazePAM 0.5 mg tablet  Commonly known as: KlonoPIN  Take 1 tablet (0.5 mg) by mouth as needed at bedtime for anxiety.  Medication Adjustments for Surgery: Take last dose 1 day (24 hours) before surgery     DULoxetine 60 mg DR capsule  Commonly known as: Cymbalta  Take 1 capsule (60 mg) by mouth once daily. Do not crush or chew.  Medication Adjustments for Surgery: Take on the morning of surgery     empagliflozin 25 mg tablet  Commonly known as: Jardiance  Take 1 tablet (25 mg) by mouth once daily.  Medication Adjustments for Surgery: Take last dose 3 days before surgery     FreeStyle Aida 3 Plus Sensor device  Generic drug: blood-glucose sensor  USE AS DIRECTED TO CHECK YOUR BLOOD AT LEAST 5 TIMES DAILY. CHANGE SENSOR EVERY 14 DAYS.     FreeStyle Aida 3 [place] misc  Generic drug: blood-glucose,,cont  Use as instructed to check blood glucose at least 5 times daily.     FreeStyle Lite Strips  Generic drug: blood sugar diagnostic     gabapentin enacarbil 600 mg tablet extended release ER tablet  Commonly known as: Horizant  TAKE 1 TABLET BY MOUTH EVERY DAY WITH FOOD AT ABOUT 5PM  Medication Adjustments for Surgery: Take/Use as prescribed     gemfibrozil 600 mg tablet  Commonly known as: Lopid  TAKE 1 TABLET BY MOUTH TWICE A DAY  Medication Adjustments for Surgery: Take/Use as  "prescribed     Lantus Solostar U-100 Insulin 100 unit/mL (3 mL) pen  Generic drug: insulin glargine  INJECT 54 UNITS SUBCUTANEOUSLY AT BEDTIME AS DIRECTED  Additional Medication Adjustments for Surgery: Other (Comment)     levothyroxine 150 mcg tablet  Commonly known as: Synthroid, Levoxyl  TAKE 1 TABLEY BY MOUTH ONCE DAILY, EXCEPT SATURDAYS & SUNDAYS TAKE 1/2 TAB  Medication Adjustments for Surgery: Take on the morning of surgery     metFORMIN 1,000 mg tablet  Commonly known as: Glucophage  TAKE 1 TABLET BY MOUTH TWICE A DAY WITH MEALS  Medication Adjustments for Surgery: Take last dose 1 day (24 hours) before surgery     * Mounjaro 5 mg/0.5 mL pen injector  Generic drug: tirzepatide  Inject 5 mg under the skin 1 (one) time per week.  Medication Adjustments for Surgery: Take last dose 2 days before surgery     * Mounjaro 2.5 mg/0.5 mL pen injector  Generic drug: tirzepatide  Inject 2.5 mg under the skin 1 (one) time per week.  Medication Adjustments for Surgery: Take last dose 2 days before surgery     pen needle, diabetic 32 gauge x 5/32\" needle  Commonly known as: BD Chey[place] 2nd Gen Pen Needle  USE AS INSTRUCTED TO INJECT INSULIN ONCE DAILY     rOPINIRole 2 mg tablet  Commonly known as: Requip  TAKE 1 TABLET (2 MG) BY MOUTH ONCE DAILY AT BEDTIME.  Medication Adjustments for Surgery: Take/Use as prescribed     SUMAtriptan 50 mg tablet  Commonly known as: Imitrex  Medication Adjustments for Surgery: Take/Use as prescribed     traMADol 50 mg tablet  Commonly known as: Ultram  Take 1 tablet (50 mg) by mouth every 6 hours if needed for severe pain (7 - 10).  Medication Adjustments for Surgery: Take/Use as prescribed           * This list has 2 medication(s) that are the same as other medications prescribed for you. Read the directions carefully, and ask your doctor or other care provider to review them with you.                                  NPO Instructions:    We want you to hold your GLP1 medications-only day of " the procedure.   No Solid Food the day before. You may have clear liquids the day before your procedure, then stop 3 hours before your procedure. Clears are Water, Black coffee, tea, Gatorade and clear Soda, Apple juice, Chicken Broth, Popsicles and Jello.     *After Midnight- NO juice or broth!  No Creamer, dairy in your drinks.         Additional Instructions:     Review your medication instructions, take indicated medications  Wear  comfortable loose fitting clothing  All jewelry and valuables should be left at home    Park in back of hospital by ER. Come up to Second floor-Outpt dept to check in.  Bring Photo ID and Insurance card,   You MUST have a  with you.  No more than 2 visitors with you please.  There is construction around the hospital- best to take [address] [facility] to the hospital.     If you get ill at all before your procedure- CALL YOUR DOCTOR/SURGEON.  We want you in the best shape that is possible. Any sickness might lead to your procedure being delayed.      Call Outpatient dept at [phone] the night before your procedure (Friday for Monday procedure), between 1-3 pm.     **If you start any new medications, especially for weight loss or antibiotics-let us know. Outpatient dept number is [phone] (M-F 7-3:30p).      Remember to hold Jardiance 3 days (stop Sat June 7!  Do not take Mounjaro day before or day of the procedure.   Take half of Lantus insulin the night before- you can check with your doctor also.

## 2025-05-13 NOTE — ANESTHESIA PREPROCEDURE EVALUATION
Patient: Mindy Dominguez    Procedure Information       Date/Time: 06/10/25 0800    Scheduled providers: Alex Klein MD    Procedure: COLONOSCOPY    Location: [facility]            Relevant Problems   Anesthesia (within normal limits)      Cardiac   (+) Hyperlipidemia      Pulmonary (within normal limits)      Neuro   (+) Anxiety and depression   (+) Migraine headache   (+) Severe major depression (Multi)   (+) Type 2 diabetes mellitus with peripheral neuropathy      GI (within normal limits)      /Renal (within normal limits)      Liver (within normal limits)      Endocrine   (+) Diabetes mellitus with complication (Multi)   (+) Hypothyroidism   (+) Type 2 diabetes mellitus with peripheral neuropathy      Hematology (within normal limits)      Musculoskeletal (within normal limits)      HEENT (within normal limits)      ID (within normal limits)      Skin (within normal limits)      GYN (within normal limits)      Nervous   (+) Restless legs syndrome      Respiratory   (+) Obstructive sleep apnea     There were no vitals filed for this visit.    Surgical History[1]  Medical History[2]  Current Medications[3]  Prior to Admission medications    Medication Sig Start Date End Date Taking? Authorizing Provider   amLODIPine (Norvasc) 2.5 mg tablet Take 1 tablet (2.5 mg) by mouth once daily. 3/11/25 9/7/25  Tammie Aldridge DO   atorvastatin (Lipitor) 80 mg tablet TAKE 1 TABLET BY MOUTH EVERY DAY 3/20/25   Tammie Aldridge DO   blood-glucose sensor (FreeStyle Aida 3 Plus Sensor) device USE AS DIRECTED TO CHECK YOUR BLOOD AT LEAST 5 TIMES DAILY. CHANGE SENSOR EVERY 14 DAYS. 3/11/25   Tammie Aldridge DO   buPROPion (Wellbutrin) 100 mg tablet TAKE 1 TABLET BY MOUTH EVERYDAY AT BEDTIME 2/10/25   Tammie Aldridge DO   cholecalciferol (Vitamin D-3) 50 mcg (2,000 unit) capsule Take 1 capsule (50 mcg) by mouth once daily. 1/26/24   Historical Provider, MD   clonazePAM (KlonoPIN) 0.5 mg tablet Take  "1 tablet (0.5 mg) by mouth as needed at bedtime for anxiety. 3/11/25   Tammie Aldridge DO   DULoxetine (Cymbalta) 60 mg DR capsule Take 1 capsule (60 mg) by mouth once daily. Do not crush or chew. 11/25/24   Tammie Aldridge DO   empagliflozin (Jardiance) 25 mg Take 1 tablet (25 mg) by mouth once daily. 1/21/25 1/21/26  Tammie Aldridge DO   FreeStyle Aida 3 [place] misc Use as instructed to check blood glucose at least 5 times daily. 5/15/24   Zaire Green MD   FreeStyle Lite Strips strip 4 times a day. 5/26/22   Historical Provider, MD   gabapentin enacarbil (Horizant) 600 mg tablet extended release ER tablet TAKE 1 TABLET BY MOUTH EVERY DAY WITH FOOD AT ABOUT 5PM 3/11/25   Tammie Aldridge DO   gemfibrozil (Lopid) 600 mg tablet TAKE 1 TABLET BY MOUTH TWICE A DAY 12/21/23   Zaire Green MD   insulin glargine (Lantus Solostar U-100 Insulin) 100 unit/mL (3 mL) pen INJECT 54 UNITS SUBCUTANEOUSLY AT BEDTIME AS DIRECTED 2/24/25   Tammie Aldridge DO   levothyroxine (Synthroid, Levoxyl) 150 mcg tablet TAKE 1 TABLEY BY MOUTH ONCE DAILY, EXCEPT SATURDAYS & SUNDAYS TAKE 1/2 TAB 1/31/25   Tammie Aldridge DO   metFORMIN (Glucophage) 1,000 mg tablet TAKE 1 TABLET BY MOUTH TWICE A DAY WITH MEALS 5/24/23   Zaire Green MD   pen needle, diabetic (BD [place] 2nd Gen Pen Needle) 32 gauge x 5/32\" needle USE AS INSTRUCTED TO INJECT INSULIN ONCE DAILY 2/24/25   Tammie Aldridge DO   rOPINIRole (Requip) 2 mg tablet TAKE 1 TABLET (2 MG) BY MOUTH ONCE DAILY AT BEDTIME. 11/21/24   Tammie Aldridge DO   SUMAtriptan (Imitrex) 50 mg tablet Take by mouth. TAKE 1 TABLET FOR MIGRAINE RELIEF. MAY REPEAT EVERY 2 HOURS. DO NOT TAKE MORE THAN 3 TABLETS IN 24 HOUR PERIOD 5/20/21   Historical Provider, MD   tirzepatide (Mounjaro) 2.5 mg/0.5 mL pen injector Inject 2.5 mg under the skin 1 (one) time per week. 2/25/25   Tammie Aldridge DO   tirzepatide (Mounjaro) 5 mg/0.5 mL pen injector Inject 5 " "mg under the skin 1 (one) time per week. 1/21/25   Tammie Aldridge,    traMADol (Ultram) 50 mg tablet Take 1 tablet (50 mg) by mouth every 6 hours if needed for severe pain (7 - 10). 4/11/25   Tammie Aldridge DO     RX Allergies[4]  Social History     Tobacco Use    Smoking status: Never    Smokeless tobacco: Never   Substance Use Topics    Alcohol use: Yes     Comment: occasional         Chemistry    Lab Results   Component Value Date/Time     03/14/2025 1458    K 4.4 03/14/2025 1458     03/14/2025 1458    CO2 21 03/14/2025 1458    BUN 22 03/14/2025 1458    CREATININE 0.82 03/14/2025 1458    Lab Results   Component Value Date/Time    CALCIUM 10.0 03/14/2025 1458    ALKPHOS 116 03/14/2025 1458    AST 14 03/14/2025 1458    ALT 24 03/14/2025 1458    BILITOT 0.4 03/14/2025 1458          Lab Results   Component Value Date/Time    WBC 5.7 03/14/2025 1458    HGB 14.6 03/14/2025 1458    HCT 44.6 03/14/2025 1458     03/14/2025 1458     No results found for: \"PROTIME\", \"PTT\", \"INR\"  No results found for this or any previous visit (from the past 4464 hours).  No results found for this or any previous visit from the past 1095 days.    Clinical information reviewed:                 Chart reviewed.  No clearances ordered.    Echo 2022-  CONCLUSIONS:   1. The left ventricular systolic function is normal with a 55-60% estimated ejection fraction.   2. Poorly visualized anatomical structures due to suboptimal image quality.   3. Spectral Doppler shows an impaired relaxation pattern of left ventricular diastolic filling.      NPO Detail:  No data recorded     Physical Exam    Airway  Mallampati: II  TM distance: >3 FB  Mouth opening: 3 or more finger widths     Cardiovascular   Rhythm: regular  Rate: normal     Dental    Pulmonary - normal exam   Abdominal            Anesthesia Plan    History of general anesthesia?: yes  History of complications of general anesthesia?: no    ASA 3     MAC     The patient " is not a current smoker.    intravenous induction   Anesthetic plan and risks discussed with patient.  Use of blood products discussed with patient who.    Plan discussed with CRNA.             [1]   Past Surgical History:  Procedure Laterality Date    CARPAL TUNNEL RELEASE  12/15/2017    Neuroplasty Decompression Median Nerve At Carpal Tunnel    EXCISION / BIOPSY BREAST / NIPPLE / DUCT Left 05/09/2014    BENIGN    HYSTERECTOMY  03/25/2013    Hysterectomy    OTHER SURGICAL HISTORY  12/15/2017    Dental Surgery    OTHER SURGICAL HISTORY  06/25/2013    Wrist Surgery   [2]   Past Medical History:  Diagnosis Date    Acute sinusitis, unspecified 06/24/2013    Acute sinusitis    Encounter for screening for infections with a predominantly sexual mode of transmission 07/09/2014    Routine screening for STI (sexually transmitted infection)    Hyperlipidemia     Hypothyroidism     Migraine without aura, not intractable, without status migrainosus 06/24/2013    Common migraine without aura    Other conditions influencing health status     Methicillin Resistant Staphylococcus Aureus Infection    Other enthesopathies, not elsewhere classified 05/16/2016    Tendonitis of shoulder    Personal history of colonic polyps 02/05/2015    History of colonic polyps    Personal history of Methicillin resistant Staphylococcus aureus infection     History of methicillin resistant Staphylococcus aureus infection    Personal history of other diseases of the nervous system and sense organs 12/04/2014    History of ear pain    Personal history of other diseases of the nervous system and sense organs     History of carpal tunnel syndrome    Personal history of other infectious and parasitic diseases 10/11/2016    History of herpes zoster    Personal history of other medical treatment 05/16/2016    History of screening mammography    Personal history of urinary (tract) infections 06/25/2013    History of urinary tract infection    Positive LEANNE  (antinuclear antibody) 03/30/2023    Rotator cuff syndrome of right shoulder 03/30/2023    Sleep apnea     Type 2 diabetes mellitus    [3]   Current Outpatient Medications:     amLODIPine (Norvasc) 2.5 mg tablet, Take 1 tablet (2.5 mg) by mouth once daily., Disp: 30 tablet, Rfl: 5    atorvastatin (Lipitor) 80 mg tablet, TAKE 1 TABLET BY MOUTH EVERY DAY, Disp: 90 tablet, Rfl: 3    blood-glucose sensor (FreeStyle Aida 3 Plus Sensor) device, USE AS DIRECTED TO CHECK YOUR BLOOD AT LEAST 5 TIMES DAILY. CHANGE SENSOR EVERY 14 DAYS., Disp: 2 each, Rfl: 11    buPROPion (Wellbutrin) 100 mg tablet, TAKE 1 TABLET BY MOUTH EVERYDAY AT BEDTIME, Disp: 90 tablet, Rfl: 3    cholecalciferol (Vitamin D-3) 50 mcg (2,000 unit) capsule, Take 1 capsule (50 mcg) by mouth once daily., Disp: , Rfl:     clonazePAM (KlonoPIN) 0.5 mg tablet, Take 1 tablet (0.5 mg) by mouth as needed at bedtime for anxiety., Disp: 30 tablet, Rfl: 0    DULoxetine (Cymbalta) 60 mg DR capsule, Take 1 capsule (60 mg) by mouth once daily. Do not crush or chew., Disp: 90 capsule, Rfl: 1    empagliflozin (Jardiance) 25 mg, Take 1 tablet (25 mg) by mouth once daily., Disp: 30 tablet, Rfl: 11    FreeStyle Aida 3 [place] misc, Use as instructed to check blood glucose at least 5 times daily., Disp: 1 each, Rfl: 0    FreeStyle Lite Strips strip, 4 times a day., Disp: , Rfl:     gabapentin enacarbil (Horizant) 600 mg tablet extended release ER tablet, TAKE 1 TABLET BY MOUTH EVERY DAY WITH FOOD AT ABOUT 5PM, Disp: 90 tablet, Rfl: 1    gemfibrozil (Lopid) 600 mg tablet, TAKE 1 TABLET BY MOUTH TWICE A DAY, Disp: 180 tablet, Rfl: 3    insulin glargine (Lantus Solostar U-100 Insulin) 100 unit/mL (3 mL) pen, INJECT 54 UNITS SUBCUTANEOUSLY AT BEDTIME AS DIRECTED, Disp: 15 mL, Rfl: 2    levothyroxine (Synthroid, Levoxyl) 150 mcg tablet, TAKE 1 TABLEY BY MOUTH ONCE DAILY, EXCEPT SATURDAYS & SUNDAYS TAKE 1/2 TAB, Disp: 72 tablet, Rfl: 2    metFORMIN (Glucophage) 1,000 mg tablet,  "TAKE 1 TABLET BY MOUTH TWICE A DAY WITH MEALS, Disp: 180 tablet, Rfl: 1    pen needle, diabetic (BD [place] 2nd Gen Pen Needle) 32 gauge x 5/32\" needle, USE AS INSTRUCTED TO INJECT INSULIN ONCE DAILY, Disp: 100 each, Rfl: 1    rOPINIRole (Requip) 2 mg tablet, TAKE 1 TABLET (2 MG) BY MOUTH ONCE DAILY AT BEDTIME., Disp: 90 tablet, Rfl: 1    SUMAtriptan (Imitrex) 50 mg tablet, Take by mouth. TAKE 1 TABLET FOR MIGRAINE RELIEF. MAY REPEAT EVERY 2 HOURS. DO NOT TAKE MORE THAN 3 TABLETS IN 24 HOUR PERIOD, Disp: , Rfl:     tirzepatide (Mounjaro) 2.5 mg/0.5 mL pen injector, Inject 2.5 mg under the skin 1 (one) time per week., Disp: 2 mL, Rfl: 0    tirzepatide (Mounjaro) 5 mg/0.5 mL pen injector, Inject 5 mg under the skin 1 (one) time per week., Disp: 2 mL, Rfl: 2    traMADol (Ultram) 50 mg tablet, Take 1 tablet (50 mg) by mouth every 6 hours if needed for severe pain (7 - 10)., Disp: 15 tablet, Rfl: 0  [4]   Allergies  Allergen Reactions    Amoxicillin-Pot Clavulanate Unknown    Lisinopril Unknown    Tetracycline Unknown     tongue swells    Sulfa (Sulfonamide Antibiotics) Hives and Rash     "

## 2025-05-14 ENCOUNTER — PHARMACY VISIT (OUTPATIENT)
Dept: PHARMACY | Facility: CLINIC | Age: 65
End: 2025-05-14
Payer: MEDICAID

## 2025-05-19 DIAGNOSIS — F32.A DEPRESSION, UNSPECIFIED: ICD-10-CM

## 2025-05-19 DIAGNOSIS — G25.81 RESTLESS LEGS SYNDROME: ICD-10-CM

## 2025-05-19 RX ORDER — DULOXETIN HYDROCHLORIDE 60 MG/1
60 CAPSULE, DELAYED RELEASE ORAL DAILY
Qty: 90 CAPSULE | Refills: 1 | Status: SHIPPED | OUTPATIENT
Start: 2025-05-19

## 2025-05-19 RX ORDER — ROPINIROLE 2 MG/1
2 TABLET, FILM COATED ORAL NIGHTLY
Qty: 90 TABLET | Refills: 1 | Status: SHIPPED | OUTPATIENT
Start: 2025-05-19

## 2025-05-20 NOTE — PROGRESS NOTES
Subjective   Patient ID: Mindy Dominguez is a 64 y.o. female who presents for Follow-up (4 months, PHQ score of 2, partially situational- her dogs been missing since April).    Body pain: Realized she was off cymbalta, she is back on now and things are better. She is a bit more blue than normal because her dog went missing. She has a C-scope of June 10th. She is planning to move forward. She was planning to get her tooth extraction but last one was very painful and so she is feeling comfortable waiting.      Diabetes: She is current on extended release metformin (now once daily), lantus 54u QHS, glimepiride bid. She is on mounjaro now and feels it is helping. She is using the michael, it is helping her more now. She is working with the  pharmacist. She has more or less not been taking mounjaro shots. She got nausea that intensified severely when she increased from 2.5mg to 5mg.      Skin itching: She gets a rash under her breasts that itches. She uses      HTN: She is on amlodipine, no SE's.      Depression and anxiety, panic attacks: Not seeing psych, she does follow with a counsellor. She has had some workup for chronic faitgue. She did notice problems with mental health off the cymbalta. Anxiety and depression were worse than normal. She is on wellbutrin for the last 9 months and she feels it is helping with the depression symptoms. She is using the klonopin prn rarely for stressful situations and insomnia.      Peripheral neuropathy, RLS: 2/2 DM. On cymbalta and gabapentin. She takes ropinirole as well. She follows with Dr. Leary. Her RLS symptoms are actually full body.      Hypothyroidism: On replacement.     HLD: On atorvastatin and gemfibrozil. No cardiac history. She has had calcium score about 2 years ago that was clear.      Migraines: Doing ok with prn sumatriptan. Hasn't had any in the last few years. Triggered by exertion and high temperatures.      Atrophic vaginitis: She has premarin cream from  gyn (Dr. Morrison) but hasn't used it. No recent episodes of bleeding.      MIKAYLA: She started CPAP 15y ago. Within the last 4 years she was extremely compliant. She has lost about 90lb in this same time period and she believes the problem is she needs her pressure changed. Machine is through [facility].      She had a large surgery on right arm in 2010, the olecranon surgery failed and she deals with chronic right pinky finger pain (Dr. Mays).      Review of systems completed and unremarkable other than what is documented in HPI.    Objective   Wt 88 kg (194 lb)   BMI 32.28 kg/m²     Gen: No acute distress, alert and oriented x3, pleasant   HEENT: moist mucous membranes, b/l external auditory canals are clear of debris, TMs within normal limits, no oropharyngeal lesions, eomi, perrla   Neck: thyroid within normal limits, no lymphadenopathy   CV: RRR, normal S1/S2, no murmur   Resp: Clear to auscultation bilaterally, no wheezes or rhonchi appreciated  Abd: soft, nontender, non-distended, no guarding/rigidity, bowel sounds present  Extr: no edema, no calf tenderness  Derm: Skin is warm and dry, no rashes appreciated  Psych: mood is good, affect is congruent, good hygiene, normal speech and eye contact  Neuro: cranial nerves grossly intact, normal gait      Assessment/Plan      #Diabetes mellitus:  On metformin xr, jardiance, lantus 54u  On mounjaro  Restart 2.5mg mounjaro after C-scope and hold at that dose for 4 months     #HTN:   Reasonable control on amlodipine     #Depression and anxiety  #Panic attacks:  #Insomnia:  Following with counsellor only  Doing ok on wellbutrin and cymbalta  Has klonopin prn for insomnia  CSA signed, OARRS reviewed  Increasing wellbutrin to 150mg XL     #Peripheral neuropathy  #RLS:   2/2 DM  On cymbalta, gabapentin, and ropinirole  Following with podiatry for care     #Hypothyroidism:   Controlled on replacement     #HLD:   Controlled on atorvastatin and gemfibrozil     #Migraines:    Resolved  Has sumatriptan for rescue     #Atrophic vaginitis:   She has premarin cream from gyn (Dr. Morrison)  No recent episodes of bleeding      #MIKAYLA:   She started CPAP 15y ago  Ordering new sleep study     HCM  Mammogram due, ordered  Flu vaccine today  UTD for COVID vaccines  She is well overdue for C-scope, we will set up in Summer, she has seen Ernie in the distant

## 2025-05-22 ENCOUNTER — APPOINTMENT (OUTPATIENT)
Dept: PRIMARY CARE | Facility: CLINIC | Age: 65
End: 2025-05-22
Payer: COMMERCIAL

## 2025-05-22 VITALS — BODY MASS INDEX: 32.28 KG/M2 | WEIGHT: 194 LBS

## 2025-05-22 DIAGNOSIS — Z12.31 SCREENING MAMMOGRAM FOR BREAST CANCER: ICD-10-CM

## 2025-05-22 DIAGNOSIS — E03.9 HYPOTHYROIDISM, UNSPECIFIED TYPE: Primary | ICD-10-CM

## 2025-05-22 DIAGNOSIS — E11.42 TYPE 2 DIABETES MELLITUS WITH PERIPHERAL NEUROPATHY: ICD-10-CM

## 2025-05-22 DIAGNOSIS — E11.8 DIABETES MELLITUS WITH COMPLICATION (MULTI): ICD-10-CM

## 2025-05-22 DIAGNOSIS — E78.5 HYPERLIPIDEMIA, UNSPECIFIED HYPERLIPIDEMIA TYPE: ICD-10-CM

## 2025-05-22 DIAGNOSIS — E11.8 DIABETES MELLITUS TYPE 2 WITH COMPLICATIONS (MULTI): ICD-10-CM

## 2025-05-22 DIAGNOSIS — F32.2 SEVERE MAJOR DEPRESSION (MULTI): ICD-10-CM

## 2025-05-22 PROCEDURE — 99214 OFFICE O/P EST MOD 30 MIN: CPT | Performed by: FAMILY MEDICINE

## 2025-05-22 RX ORDER — BUPROPION HYDROCHLORIDE 150 MG/1
150 TABLET ORAL EVERY MORNING
Qty: 90 TABLET | Refills: 3 | Status: SHIPPED | OUTPATIENT
Start: 2025-05-22 | End: 2026-05-22

## 2025-05-22 RX ORDER — ACETAMINOPHEN 500 MG
TABLET ORAL
COMMUNITY
Start: 2025-05-19

## 2025-05-22 RX ORDER — TIRZEPATIDE 2.5 MG/.5ML
2.5 INJECTION, SOLUTION SUBCUTANEOUS WEEKLY
Qty: 2 ML | Refills: 3 | Status: SHIPPED | OUTPATIENT
Start: 2025-05-22

## 2025-05-22 ASSESSMENT — PATIENT HEALTH QUESTIONNAIRE - PHQ9
SUM OF ALL RESPONSES TO PHQ9 QUESTIONS 1 AND 2: 2
1. LITTLE INTEREST OR PLEASURE IN DOING THINGS: SEVERAL DAYS
2. FEELING DOWN, DEPRESSED OR HOPELESS: SEVERAL DAYS
10. IF YOU CHECKED OFF ANY PROBLEMS, HOW DIFFICULT HAVE THESE PROBLEMS MADE IT FOR YOU TO DO YOUR WORK, TAKE CARE OF THINGS AT HOME, OR GET ALONG WITH OTHER PEOPLE: SOMEWHAT DIFFICULT

## 2025-05-22 NOTE — PATIENT INSTRUCTIONS
Lab Work:  Schedule an appointment for labs at [email or web address] or call [phone] 24 hours a day, 7 days a week.   You can also download the [facility] lab scheduling linda on your phone.     Radiology:  Andrea:  [phone]

## 2025-06-03 DIAGNOSIS — F41.9 ANXIETY DISORDER, UNSPECIFIED: ICD-10-CM

## 2025-06-03 RX ORDER — CLONAZEPAM 0.5 MG/1
0.5 TABLET ORAL NIGHTLY PRN
Qty: 30 TABLET | Refills: 0 | Status: SHIPPED | OUTPATIENT
Start: 2025-06-03

## 2025-06-06 RX ORDER — SODIUM CHLORIDE, SODIUM LACTATE, POTASSIUM CHLORIDE, CALCIUM CHLORIDE 600; 310; 30; 20 MG/100ML; MG/100ML; MG/100ML; MG/100ML
50 INJECTION, SOLUTION INTRAVENOUS CONTINUOUS
Status: CANCELLED | OUTPATIENT
Start: 2025-06-06 | End: 2025-06-06

## 2025-06-10 ENCOUNTER — APPOINTMENT (OUTPATIENT)
Dept: GASTROENTEROLOGY | Facility: HOSPITAL | Age: 65
End: 2025-06-10
Payer: COMMERCIAL

## 2025-06-10 ENCOUNTER — ANESTHESIA (OUTPATIENT)
Dept: GASTROENTEROLOGY | Facility: HOSPITAL | Age: 65
End: 2025-06-10
Payer: COMMERCIAL

## 2025-06-12 ENCOUNTER — HOSPITAL ENCOUNTER (OUTPATIENT)
Dept: GASTROENTEROLOGY | Facility: HOSPITAL | Age: 65
Discharge: HOME | End: 2025-06-12
Payer: COMMERCIAL

## 2025-06-12 VITALS
SYSTOLIC BLOOD PRESSURE: 124 MMHG | OXYGEN SATURATION: 97 % | WEIGHT: 195 LBS | RESPIRATION RATE: 18 BRPM | BODY MASS INDEX: 32.49 KG/M2 | DIASTOLIC BLOOD PRESSURE: 79 MMHG | HEART RATE: 80 BPM | HEIGHT: 65 IN | TEMPERATURE: 97.3 F

## 2025-06-12 DIAGNOSIS — Z12.11 SCREENING FOR COLON CANCER: ICD-10-CM

## 2025-06-12 DIAGNOSIS — Z12.11 SCREEN FOR COLON CANCER: ICD-10-CM

## 2025-06-12 PROCEDURE — 45330 DIAGNOSTIC SIGMOIDOSCOPY: CPT | Performed by: SURGERY

## 2025-06-12 PROCEDURE — 7100000010 HC PHASE TWO TIME - EACH INCREMENTAL 1 MINUTE

## 2025-06-12 PROCEDURE — 7100000009 HC PHASE TWO TIME - INITIAL BASE CHARGE

## 2025-06-12 PROCEDURE — 2500000004 HC RX 250 GENERAL PHARMACY W/ HCPCS (ALT 636 FOR OP/ED): Mod: SE | Performed by: NURSE ANESTHETIST, CERTIFIED REGISTERED

## 2025-06-12 PROCEDURE — 3700000002 HC GENERAL ANESTHESIA TIME - EACH INCREMENTAL 1 MINUTE

## 2025-06-12 PROCEDURE — 3700000001 HC GENERAL ANESTHESIA TIME - INITIAL BASE CHARGE

## 2025-06-12 RX ORDER — IBUPROFEN 200 MG
600 TABLET ORAL AS NEEDED
COMMUNITY

## 2025-06-12 RX ORDER — PROPOFOL 10 MG/ML
INJECTION, EMULSION INTRAVENOUS AS NEEDED
Status: DISCONTINUED | OUTPATIENT
Start: 2025-06-12 | End: 2025-06-12

## 2025-06-12 RX ORDER — LIDOCAINE HYDROCHLORIDE 20 MG/ML
INJECTION, SOLUTION EPIDURAL; INFILTRATION; INTRACAUDAL; PERINEURAL AS NEEDED
Status: DISCONTINUED | OUTPATIENT
Start: 2025-06-12 | End: 2025-06-12

## 2025-06-12 RX ADMIN — PROPOFOL 50 MG: 10 INJECTION, EMULSION INTRAVENOUS at 09:26

## 2025-06-12 RX ADMIN — LIDOCAINE HYDROCHLORIDE 40 MG: 20 INJECTION, SOLUTION EPIDURAL; INFILTRATION; INTRACAUDAL; PERINEURAL at 09:21

## 2025-06-12 RX ADMIN — PROPOFOL 80 MG: 10 INJECTION, EMULSION INTRAVENOUS at 09:21

## 2025-06-12 RX ADMIN — SODIUM CHLORIDE, POTASSIUM CHLORIDE, SODIUM LACTATE AND CALCIUM CHLORIDE: 600; 310; 30; 20 INJECTION, SOLUTION INTRAVENOUS at 09:26

## 2025-06-12 RX ADMIN — PROPOFOL 50 MG: 10 INJECTION, EMULSION INTRAVENOUS at 09:23

## 2025-06-12 SDOH — HEALTH STABILITY: MENTAL HEALTH: CURRENT SMOKER: 0

## 2025-06-12 ASSESSMENT — PAIN - FUNCTIONAL ASSESSMENT
PAIN_FUNCTIONAL_ASSESSMENT: 0-10

## 2025-06-12 ASSESSMENT — PAIN SCALES - GENERAL
PAINLEVEL_OUTOF10: 0 - NO PAIN
PAIN_LEVEL: 0
PAINLEVEL_OUTOF10: 0 - NO PAIN

## 2025-06-12 ASSESSMENT — COLUMBIA-SUICIDE SEVERITY RATING SCALE - C-SSRS
1. IN THE PAST MONTH, HAVE YOU WISHED YOU WERE DEAD OR WISHED YOU COULD GO TO SLEEP AND NOT WAKE UP?: NO
2. HAVE YOU ACTUALLY HAD ANY THOUGHTS OF KILLING YOURSELF?: NO
6. HAVE YOU EVER DONE ANYTHING, STARTED TO DO ANYTHING, OR PREPARED TO DO ANYTHING TO END YOUR LIFE?: NO

## 2025-06-12 NOTE — H&P
"History Of Present Illness  Mindy Dominguez is a 64 y.o. female presenting for a colonoscopy      Past Medical History  Medical History[1]    Surgical History  Surgical History[2]     Social History  She reports that she has never smoked. She has never used smokeless tobacco. She reports current alcohol use. She reports that she does not use drugs.    Family History  Family History[3]     Allergies  Tetracycline, Lisinopril, and Sulfa (sulfonamide antibiotics)    Review of Systems   All other systems reviewed and are negative.       Physical Exam  Constitutional:       Appearance: Normal appearance.   Cardiovascular:      Heart sounds: Normal heart sounds.   Pulmonary:      Breath sounds: Normal breath sounds and air entry.   Abdominal:      General: Abdomen is flat.      Palpations: Abdomen is soft.      Tenderness: There is no abdominal tenderness.   Neurological:      Mental Status: She is alert.          Last Recorded Vitals  Blood pressure 159/78, pulse 82, temperature 36.1 °C (97 °F), temperature source Temporal, resp. rate 17, height 1.651 m (5' 5\"), weight 88.5 kg (195 lb), SpO2 98%.          Assessment & Plan  Screening for colon cancer      COLONOSCOPY/BIOPSIES.  Risks include, but not limited to pain, infection, bleeding, perforation, missed lesions, aspiration, risk of cardiac, pulmonary, neurologic, locomotor, anesthetic events, incomplete colonoscopy, and other unforeseen complications including death      Alex Klein MD         [1]   Past Medical History:  Diagnosis Date    Acute sinusitis, unspecified 06/24/2013    Acute sinusitis    Encounter for screening for infections with a predominantly sexual mode of transmission 07/09/2014    Routine screening for STI (sexually transmitted infection)    Hyperlipidemia     Hypertension     Hypothyroidism     Migraine without aura, not intractable, without status migrainosus 06/24/2013    Common migraine without aura    Other conditions influencing health " status     Methicillin Resistant Staphylococcus Aureus Infection    Other enthesopathies, not elsewhere classified 05/16/2016    Tendonitis of shoulder    Personal history of colonic polyps 02/05/2015    History of colonic polyps    Personal history of Methicillin resistant Staphylococcus aureus infection     History of methicillin resistant Staphylococcus aureus infection    Personal history of other diseases of the nervous system and sense organs 12/04/2014    History of ear pain    Personal history of other diseases of the nervous system and sense organs     History of carpal tunnel syndrome    Personal history of other infectious and parasitic diseases 10/11/2016    History of herpes zoster    Personal history of other medical treatment 05/16/2016    History of screening mammography    Personal history of urinary (tract) infections 06/25/2013    History of urinary tract infection    Positive LEANNE (antinuclear antibody) 03/30/2023    Restless leg syndrome     Rotator cuff syndrome of right shoulder 03/30/2023    Sleep apnea     No CPAP    Type 2 diabetes mellitus    [2]   Past Surgical History:  Procedure Laterality Date    CARPAL TUNNEL RELEASE  12/15/2017    Neuroplasty Decompression Median Nerve At Carpal Tunnel    EXCISION / BIOPSY BREAST / NIPPLE / DUCT Left 05/09/2014    BENIGN    HYSTERECTOMY  03/25/2013    Hysterectomy    OTHER SURGICAL HISTORY  12/15/2017    Dental Surgery    OTHER SURGICAL HISTORY  06/25/2013    Wrist Surgery    OTHER SURGICAL HISTORY  2011    I&D MRSA abdomen and labia   [3]   Family History  Problem Relation Name Age of Onset    Cervical cancer Mother      Hypertension Father      Pancreatic cancer Father      Coronary artery disease Maternal Grandmother      Diabetes Other uncle

## 2025-06-12 NOTE — DISCHARGE INSTRUCTIONS
Patient Instructions after a Colonoscopy      The anesthetics, sedatives or narcotics which were given to you today will be acting in your body for the next 24 hours, so you might feel a little sleepy or groggy.  This feeling should slowly wear off. Carefully read and follow the instructions.     You received sedation today:  - Do not drive or operate any machinery or power tools of any kind.   - No alcoholic beverages today, not even beer or wine.  - Do not make any important decisions or sign any legal documents.  - No over the counter medications that contain alcohol or that may cause drowsiness.  - Do not make any important decisions or sign any legal documents.  - Make sure you have someone with you for first 24 hours.    While it is common to experience mild to moderate abdominal distention, gas, or belching after your procedure, if any of these symptoms occur following discharge from the GI Lab or within one week of having your procedure, call the [facility] to be advised whether a visit to your nearest Urgent Care or Emergency Department is indicated.  Take this paper with you if you go.     - If you develop an allergic reaction to the medications that were given during your procedure such as difficulty breathing, rash, hives, severe nausea, vomiting or lightheadedness.  - If you experience chest pain, shortness of breath, severe abdominal pain, fevers and chills.  -If you develop signs and symptoms of bleeding such as blood in your spit, if your stools turn black, tarry, or bloody  - If you have not urinated within 8 hours following your procedure.  - If your IV site becomes painful, red, inflamed, or looks infected.    If you received a biopsy/polypectomy/sphincterotomy the following instructions apply below:    __ Do not use Aspirin containing products, non-steroidal medications or anti-coagulants for one week following your procedure. (Examples of these types of medications are: Advil,  Arthrotec, Aleve, Coumadin, Ecotrin, Heparin, Ibuprofen, Indocin, Motrin, Naprosyn, Nuprin, Plavix, Vioxx, and Voltarin, or their generic forms.  This list is not all-inclusive.  Check with your physician or pharmacist before resuming medications.)   __ Eat a soft diet today.  Avoid foods that are poorly digested for the next 24 hours.  These foods would include: nuts, beans, lettuce, red meats, and fried foods. Start with liquids and advance your diet as tolerated, gradually work up to eating solids.   __ Do not have a Barium Study or Enema for one week.    Your physician recommends the additional following instructions:    -You have a contact number available for emergencies. The signs and symptoms of potential delayed complications were discussed with you. You may return to normal activities tomorrow.  -Resume your previous diet.  -Continue your present medications.   -We are waiting for your pathology results.  -Your physician has recommended a repeat colonoscopy (date to be determined after pending pathology results are reviewed) for surveillance based on pathology results.  -The findings and recommendations have been discussed with you.  -The findings and recommendations were discussed with your family.  - Please see Medication Reconciliation Form for new medication/medications prescribed.       If you experience any problems or have any questions following discharge from the GI Lab, please call:        Nurse Signature                                                                        Date___________________                                                                            Patient/Responsible Party Signature                                        Date___________________

## 2025-06-12 NOTE — ANESTHESIA POSTPROCEDURE EVALUATION
Patient: Mindy Dominguez    Procedure Summary       Date: 06/12/25 Room / Location: [facility]    Anesthesia Start: 0918 Anesthesia Stop: 0931    Procedure: COLONOSCOPY Diagnosis: Screening for colon cancer    Scheduled Providers: Alex Klein MD Responsible Provider: MAX Woo    Anesthesia Type: MAC ASA Status: 3            Anesthesia Type: MAC    Vitals Value Taken Time   /79 06/12/25 09:44   Temp 36.3 °C (97.3 °F) 06/12/25 09:29   Pulse 80 06/12/25 09:44   Resp 18 06/12/25 09:44   SpO2 97 % 06/12/25 09:44       Anesthesia Post Evaluation    Patient location during evaluation: PACU  Patient participation: complete - patient participated  Level of consciousness: awake and alert  Pain score: 0  Pain management: adequate  Airway patency: patent  Cardiovascular status: acceptable, blood pressure returned to baseline and hemodynamically stable  Respiratory status: acceptable, room air and spontaneous ventilation  Hydration status: acceptable  Postoperative Nausea and Vomiting: none        There were no known notable events for this encounter.

## 2025-06-13 DIAGNOSIS — E11.8 DIABETES MELLITUS WITH COMPLICATION (MULTI): Primary | ICD-10-CM

## 2025-06-13 DIAGNOSIS — E11.8 DIABETES MELLITUS WITH COMPLICATION (MULTI): ICD-10-CM

## 2025-06-13 RX ORDER — INSULIN GLARGINE 100 [IU]/ML
60 INJECTION, SOLUTION SUBCUTANEOUS NIGHTLY
Qty: 15 ML | Refills: 2 | Status: SHIPPED | OUTPATIENT
Start: 2025-06-13

## 2025-06-13 ASSESSMENT — PAIN SCALES - GENERAL: PAINLEVEL_OUTOF10: 0 - NO PAIN

## 2025-06-30 DIAGNOSIS — F41.9 ANXIETY DISORDER, UNSPECIFIED: ICD-10-CM

## 2025-06-30 RX ORDER — CLONAZEPAM 0.5 MG/1
0.5 TABLET ORAL NIGHTLY PRN
Qty: 30 TABLET | Refills: 0 | Status: SHIPPED | OUTPATIENT
Start: 2025-06-30

## 2025-07-08 ENCOUNTER — APPOINTMENT (OUTPATIENT)
Dept: PHARMACY | Facility: HOSPITAL | Age: 65
End: 2025-07-08
Payer: COMMERCIAL

## 2025-07-08 DIAGNOSIS — E11.8 DIABETES MELLITUS WITH COMPLICATION (MULTI): ICD-10-CM

## 2025-07-08 NOTE — PROGRESS NOTES
Clinical Pharmacy Appointment    Patient ID: Mindy Dominguez is a 65 y.o. female who presents for diabetes.     This is a Follow Up appointment.     Referring Provider: Tammie Aldridge DO  PCP: Tammie Aldridge DO   Last visit with PCP: 5/22/25  Next visit with PCP: 10/1/25    Subjective     DIABETES MELLITUS TYPE II:    Known diabetic complications: neuropathy.  Does patient follow with Endocrinology: No  Last optometry exam: 10/11/24  Most recent visit in Podiatry: unknown-- patient denies sores or cuts on feet today       Current diabetic medications include:  Metformin XR 1000 mg once daily at bedtime    Jardiance 25 mg daily   Mounjaro 2.5 mg once weekly on Thursdays  Lantus 60 units at bedtime    Clarifications to regimen: Patient not taking Mounjaro  Adverse Effects: None     Past diabetic medications  Glimepiride 4 mg twice daily (stopped due to low BG readings when starting Mounjaro)    Glucose Readings:  Glucometer/CGM Type: Freestyle Aida 3 plus with phone application  Sent invitation for Libreview - not yet accepted     Restarted sensor about 1 week ago.  150-300 mg/dL most of the time.    7 day:   TAR: 32% + 34%  TIR: 34%  TBR: 0%    Any episodes of hypoglycemia? No, none.  Did patient treat episode of hypoglycemia appropriately? N/A  Glucagon: No    Lifestyle:  Diet: trying to incorporate meal for breakfast. Trying to drink less sugar and eat fewer sugary snacks. Drinks diet pop. Purchased water packets with zero calories. Trying to incorporate more salads and vegetables. Eating more candy around [place]. Spaghetti with chickpea pasta. Ate 18 popsicles in 2 hours yesterday. Eating a lot of sugar recently.   Physical activity: Limited    Primary/Secondary Prevention:   - Statin? Yes - Atorvastatin 80 mg daily   - ACE-I/ARB? No  - Aspirin? No     Pertinent PMH Review:  - PMH of Pancreatitis: No  - PMH of Retinopathy: No  - PMH of Urinary Tract Infections: Yes (remote history per patient)  -  PMH of MTC: No  - PMH of ASCVD: Yes  - PMH of CKD: No  - PMH of HF: No   - PMH of Obesity: Yes     Medication Reconciliation:  No changes     Drug Interactions  No relevant drug interactions were noted.     Medication System Management  Patient's preferred pharmacy: [facility]  Adherence/Organization: Does not wake up early enough to take morning medications. Has struggled with adherence with twice daily medications.  Affordability/Accessibility: No issues     Objective   Allergies   Allergen Reactions    Tetracycline Unknown     tongue swells    Lisinopril Cough    Sulfa (Sulfonamide Antibiotics) Hives and Rash     Social History     Social History Narrative    Not on file      Medication Review  Current Outpatient Medications   Medication Instructions    amLODIPine (NORVASC) 2.5 mg, oral, Daily    atorvastatin (LIPITOR) 80 mg, oral, Daily    blood-glucose sensor (FreeStyle Aida 3 Plus Sensor) device USE AS DIRECTED TO CHECK YOUR BLOOD AT LEAST 5 TIMES DAILY. CHANGE SENSOR EVERY 14 DAYS.    buPROPion (WELLBUTRIN) 100 mg, oral, Nightly    buPROPion XL (WELLBUTRIN XL) 150 mg, oral, Every morning, Do not crush, chew, or split.    cholecalciferol (Vitamin D-3) 50 mcg (2,000 units) capsule     clonazePAM (KLONOPIN) 0.5 mg, oral, Nightly PRN    DULoxetine (CYMBALTA) 60 mg, oral, Daily, Swallow whole. Do not crush or chew.    empagliflozin (JARDIANCE) 25 mg, oral, Daily    FreeStyle Aida 3 [place] misc Use as instructed to check blood glucose at least 5 times daily.    FreeStyle Lite Strips strip 4 times daily    gabapentin enacarbil (Horizant) 600 mg tablet extended release ER tablet TAKE 1 TABLET BY MOUTH EVERY DAY WITH FOOD AT ABOUT 5PM    gemfibrozil (LOPID) 600 mg, oral, 2 times daily    ibuprofen 600 mg, As needed    Lantus Solostar U-100 Insulin 60 Units, subcutaneous, Nightly    levothyroxine (Synthroid, Levoxyl) 150 mcg tablet TAKE 1 TABLEY BY MOUTH ONCE DAILY, EXCEPT SATURDAYS & SUNDAYS TAKE 1/2 TAB     "metFORMIN (Glucophage) 1,000 mg tablet TAKE 1 TABLET BY MOUTH TWICE A DAY WITH MEALS    Mounjaro 2.5 mg, subcutaneous, Weekly    pen needle, diabetic (BD [place] 2nd Gen Pen Needle) 32 gauge x 5/32\" needle USE AS INSTRUCTED TO INJECT INSULIN ONCE DAILY    rOPINIRole (REQUIP) 2 mg, oral, Nightly    sod picosulf-mag ox-citric ac (Clenpiq) 10 mg-3.5 gram- 12 gram/175 mL solution Take one bottle twice as directed by the prep instructions    SUMAtriptan (Imitrex) 50 mg tablet Take by mouth. TAKE 1 TABLET FOR MIGRAINE RELIEF. MAY REPEAT EVERY 2 HOURS. DO NOT TAKE MORE THAN 3 TABLETS IN 24 HOUR PERIOD    traMADol (ULTRAM) 50 mg, oral, Every 6 hours PRN      Vitals  BP Readings from Last 2 Encounters:   06/12/25 124/79   03/14/25 123/84     BMI Readings from Last 1 Encounters:   06/12/25 32.45 kg/m²      Labs  A1C  Lab Results   Component Value Date    HGBA1C 11.2 (H) 03/14/2025    HGBA1C 12.4 (H) 09/10/2024    HGBA1C 11.6 (A) 04/02/2024     BMP  Lab Results   Component Value Date    CALCIUM 10.0 03/14/2025     03/14/2025    K 4.4 03/14/2025    CO2 21 03/14/2025     03/14/2025    BUN 22 03/14/2025    CREATININE 0.82 03/14/2025    EGFR 80 03/14/2025     LFTs  Lab Results   Component Value Date    ALT 24 03/14/2025    AST 14 03/14/2025    ALKPHOS 116 03/14/2025    BILITOT 0.4 03/14/2025     FLP  Lab Results   Component Value Date    TRIG 200 (H) 04/27/2023    CHOL 230 (H) 04/27/2023    LDLF 150 (H) 04/27/2023    HDL 39.8 (A) 04/27/2023     Urine Microalbumin  Lab Results   Component Value Date    MICROALBCREA 30 - 300 (A) 04/02/2024     Weight Management  Wt Readings from Last 3 Encounters:   06/12/25 88.5 kg (195 lb)   05/22/25 88 kg (194 lb)   05/13/25 88.5 kg (195 lb)      There is no height or weight on file to calculate BMI.     Assessment/Plan   Problem List Items Addressed This Visit       Diabetes mellitus with complication (Multi)    Relevant Orders    Referral to Clinical Pharmacy     Patient's goal A1c is " < 7.0%. Is pt at goal? No, most recent A1c on 9/10/24 of 12.4%. Prior A1c was 11.6% on 4/2/24. Next A1c will be due after 12/10/24.        Rationale for plan: Patient checks BG continuously with Freestyle Aida 3 plus sensors and application. Over the past week, patient's SMBGs are above goal the majority of the time. TIR of 34% which is improved from 6% at previous visit. Patient denies any low BG events. Current DM regimen includes Metformin XR 1000 mg once daily at bedtime, Jardiance 25 mg daily at bedtime, and Lantus 60 units at bedtime. Patient has not yet restarted Mounjaro but is planning to restart this week. Patient reports that she has been under a lot of stress recently but is planning to refocus on her DM control. Also working to improve diet and select lower carbohydrate foods. Encouraged medication adherence and lifestyle changes. Plan to follow up in 2 weeks to review BG readings and determine need for medication adjustments.     Medication Changes:  CONTINUE:  Metformin XR 1000 mg once daily at bedtime  Jardiance 25 mg daily at bedtime   Lantus 60 units at bedtime  Freestyle Aida 3 plus sensors with phone application  RESTART  Mounjaro 2.5 mg once weekly  Per PCP recommendation, continue at this dose for 4 months before dose increase.     Future Considerations:   Mounjaro titration for BG and weight loss goals.      Monitoring and Education:  Mounjaro Education:   Counseled patient on Mounjaro MOA, expectations, side effects, duration of therapy, administration, and monitoring parameters.  Provided detailed dosing and administration counseling to ensure proper technique.   Reviewed Mounjaro titration schedule, starting with 2.5 mg once weekly to a goal of 15 mg once weekly if tolerated  Counseled patient on the benefits of GLP-1ra glycemic control and weight loss  Reviewed storage requirements of Mounjaro when not in use, and when to administer the medication if a dose is missed.  Advised patient  that they may experience improved satiety after meals and portion sizes of meals may be reduced as doses of Mounjaro increase.  Jardiance Education:   Counseled patient on Jardiance MOA, expectations, side effects, duration of therapy, administration, and monitoring parameters.  Reviewed the benefits of SGLT-2i therapy, such as glycemic control and kidney and CV protection.  Advised patient to practice proper  hygiene to reduce risk of UTIs or yeast infections.  Advised patient to maintain adequate fluid intake to remain hydrated while on SGLT2i therapy.  Answered all patient questions and concerns.    Clinical Pharmacist follow-up: 7/25/25 @ 1100 am, Telehealth visit    Continue all meds under the continuation of care with the referring provider and clinical pharmacy team.    Thank you,  Chichi Moreno, PharmD  Clinical Pharmacist  [phone]    Verbal consent to manage patient's drug therapy was obtained from the patient. They were informed they may decline to participate or withdraw from participation in pharmacy services at any time.

## 2025-07-25 ENCOUNTER — APPOINTMENT (OUTPATIENT)
Dept: PHARMACY | Facility: HOSPITAL | Age: 65
End: 2025-07-25
Payer: COMMERCIAL

## 2025-07-25 DIAGNOSIS — E11.8 DIABETES MELLITUS WITH COMPLICATION (MULTI): Primary | ICD-10-CM

## 2025-07-25 NOTE — PROGRESS NOTES
Clinical Pharmacy Appointment    Patient ID: Mindy Dominguez is a 65 y.o. female who presents for diabetes.     This is a Follow Up appointment.     Referring Provider: Tammie Aldridge DO  PCP: Tammie Aldridge DO   Last visit with PCP: 5/22/25  Next visit with PCP: 10/1/25    Subjective     DIABETES MELLITUS TYPE II:    Known diabetic complications: neuropathy.  Does patient follow with Endocrinology: No  Last optometry exam: 10/11/24  Most recent visit in Podiatry: unknown-- patient denies sores or cuts on feet today       Current diabetic medications include:  Metformin XR 1000 mg once daily at bedtime    Jardiance 25 mg daily   Mounjaro 2.5 mg once weekly on Thursdays  Lantus 60 units at bedtime    Clarifications to regimen: Patient not taking Mounjaro  Adverse Effects: None     Past diabetic medications  Glimepiride 4 mg twice daily (stopped due to low BG readings when starting Mounjaro)    Glucose Readings:  Glucometer/CGM Type: Freestyle Aida 3 plus with phone application  Sent invitation for Libreview - not yet accepted     Restarted sensor about 1 week ago.  150-300 mg/dL most of the time.    Current visit 7 day:   TAR: 29% + 37%  TIR: 34%  TBR: 0%    Past visit 7 day:   TAR: 32% + 34%  TIR: 34%  TBR: 0%    Any episodes of hypoglycemia? No, none.  Did patient treat episode of hypoglycemia appropriately? N/A  Glucagon: No    Lifestyle:  Diet: trying to incorporate meal for breakfast. Trying to drink less sugar and eat fewer sugary snacks. Drinks diet pop. Purchased water packets with zero calories. Trying to incorporate more salads and vegetables. Eating more candy around [place]. Spaghetti with chickpea pasta. Ate 18 popsicles in 2 hours yesterday. Eating a lot of sugar recently. Diet popsicles  Physical activity: Limited    Primary/Secondary Prevention:   - Statin? Yes - Atorvastatin 80 mg daily   - ACE-I/ARB? No  - Aspirin? No     Pertinent PMH Review:  - PMH of Pancreatitis: No  - PMH of  Retinopathy: No  - PMH of Urinary Tract Infections: Yes (remote history per patient)  - PMH of MTC: No  - PMH of ASCVD: Yes  - PMH of CKD: No  - PMH of HF: No   - PMH of Obesity: Yes     Medication Reconciliation:  No changes     Drug Interactions  No relevant drug interactions were noted.     Medication System Management  Patient's preferred pharmacy: [facility]  Adherence/Organization: Does not wake up early enough to take morning medications. Has struggled with adherence with twice daily medications.  Affordability/Accessibility: No issues     Objective   Allergies   Allergen Reactions    Tetracycline Unknown     tongue swells    Lisinopril Cough    Sulfa (Sulfonamide Antibiotics) Hives and Rash     Social History     Social History Narrative    Not on file      Medication Review  Current Outpatient Medications   Medication Instructions    amLODIPine (NORVASC) 2.5 mg, oral, Daily    atorvastatin (LIPITOR) 80 mg, oral, Daily    blood-glucose sensor (FreeStyle Aida 3 Plus Sensor) device USE AS DIRECTED TO CHECK YOUR BLOOD AT LEAST 5 TIMES DAILY. CHANGE SENSOR EVERY 14 DAYS.    buPROPion (WELLBUTRIN) 100 mg, oral, Nightly    buPROPion XL (WELLBUTRIN XL) 150 mg, oral, Every morning, Do not crush, chew, or split.    cholecalciferol (Vitamin D-3) 50 mcg (2,000 units) capsule     clonazePAM (KLONOPIN) 0.5 mg, oral, Nightly PRN    DULoxetine (CYMBALTA) 60 mg, oral, Daily, Swallow whole. Do not crush or chew.    empagliflozin (JARDIANCE) 25 mg, oral, Daily    FreeStyle Aida 3 [place] misc Use as instructed to check blood glucose at least 5 times daily.    FreeStyle Lite Strips strip 4 times daily    gabapentin enacarbil (Horizant) 600 mg tablet extended release ER tablet TAKE 1 TABLET BY MOUTH EVERY DAY WITH FOOD AT ABOUT 5PM    gemfibrozil (LOPID) 600 mg, oral, 2 times daily    ibuprofen 600 mg, As needed    Lantus Solostar U-100 Insulin 60 Units, subcutaneous, Nightly    levothyroxine (Synthroid, Levoxyl) 150 mcg  "tablet TAKE 1 TABLEY BY MOUTH ONCE DAILY, EXCEPT SATURDAYS & SUNDAYS TAKE 1/2 TAB    metFORMIN (Glucophage) 1,000 mg tablet TAKE 1 TABLET BY MOUTH TWICE A DAY WITH MEALS    Mounjaro 2.5 mg, subcutaneous, Weekly    pen needle, diabetic (BD [place] 2nd Gen Pen Needle) 32 gauge x 5/32\" needle USE AS INSTRUCTED TO INJECT INSULIN ONCE DAILY    rOPINIRole (REQUIP) 2 mg, oral, Nightly    sod picosulf-mag ox-citric ac (Clenpiq) 10 mg-3.5 gram- 12 gram/175 mL solution Take one bottle twice as directed by the prep instructions    SUMAtriptan (Imitrex) 50 mg tablet Take by mouth. TAKE 1 TABLET FOR MIGRAINE RELIEF. MAY REPEAT EVERY 2 HOURS. DO NOT TAKE MORE THAN 3 TABLETS IN 24 HOUR PERIOD    traMADol (ULTRAM) 50 mg, oral, Every 6 hours PRN      Vitals  BP Readings from Last 2 Encounters:   06/12/25 124/79   03/14/25 123/84     BMI Readings from Last 1 Encounters:   06/12/25 32.45 kg/m²      Labs  A1C  Lab Results   Component Value Date    HGBA1C 11.2 (H) 03/14/2025    HGBA1C 12.4 (H) 09/10/2024    HGBA1C 11.6 (A) 04/02/2024     BMP  Lab Results   Component Value Date    CALCIUM 10.0 03/14/2025     03/14/2025    K 4.4 03/14/2025    CO2 21 03/14/2025     03/14/2025    BUN 22 03/14/2025    CREATININE 0.82 03/14/2025    EGFR 80 03/14/2025     LFTs  Lab Results   Component Value Date    ALT 24 03/14/2025    AST 14 03/14/2025    ALKPHOS 116 03/14/2025    BILITOT 0.4 03/14/2025     FLP  Lab Results   Component Value Date    TRIG 200 (H) 04/27/2023    CHOL 230 (H) 04/27/2023    LDLF 150 (H) 04/27/2023    HDL 39.8 (A) 04/27/2023     Urine Microalbumin  Lab Results   Component Value Date    MICROALBCREA 30 - 300 (A) 04/02/2024     Weight Management  Wt Readings from Last 3 Encounters:   06/12/25 88.5 kg (195 lb)   05/22/25 88 kg (194 lb)   05/13/25 88.5 kg (195 lb)      There is no height or weight on file to calculate BMI.     Assessment/Plan   Problem List Items Addressed This Visit       Diabetes mellitus with complication " (Multi) - Primary    Relevant Orders    Referral to Clinical Pharmacy     Patient's goal A1c is < 7.0%. Is pt at goal? No, most recent A1c on 9/10/24 of 12.4%. Prior A1c was 11.6% on 4/2/24. Next A1c will be due after 12/10/24.     Rationale for plan: Patient checks BG continuously with Freestyle Aida 3 plus sensors and application. Over the past week, patient's SMBGs are above goal the majority of the time. TIR of 34% which is consistent when compared to 34% at previous visit. Patient denies any low BG events. Current DM regimen includes Metformin XR 1000 mg once daily at bedtime, Jardiance 25 mg daily at bedtime, and Lantus 60 units at bedtime. Patient has not yet restarted Mounjaro. Patient is agreeable to starting today. Completed injection on phone and is planning to continue once weekly schedule. Patient continues on working to improve diet and select lower carbohydrate foods. Encouraged medication adherence and lifestyle changes. Plan to follow up in 1 week to block out time for patient to do Mounjaro injection again. Will also review BG readings and determine need for medication adjustments.     Medication Changes:  CONTINUE:  Metformin XR 1000 mg once daily at bedtime  Jardiance 25 mg daily at bedtime   Lantus 60 units at bedtime  Freestyle Aida 3 plus sensors with phone application  RESTART  Mounjaro 2.5 mg once weekly on Fridays   Restarted injection today, plan for weekly calls to block out time for patient to complete injection  Per PCP recommendation, continue at this dose for 4 months before dose increase.     Future Considerations:   Mounjaro titration for BG and weight loss goals.      Monitoring and Education:  Mounjaro Education:   Counseled patient on Mounjaro MOA, expectations, side effects, duration of therapy, administration, and monitoring parameters.  Provided detailed dosing and administration counseling to ensure proper technique.   Reviewed Mounjaro titration schedule, starting with 2.5  mg once weekly to a goal of 15 mg once weekly if tolerated  Counseled patient on the benefits of GLP-1ra glycemic control and weight loss  Reviewed storage requirements of Mounjaro when not in use, and when to administer the medication if a dose is missed.  Advised patient that they may experience improved satiety after meals and portion sizes of meals may be reduced as doses of Mounjaro increase.  Jardiance Education:   Counseled patient on Jardiance MOA, expectations, side effects, duration of therapy, administration, and monitoring parameters.  Reviewed the benefits of SGLT-2i therapy, such as glycemic control and kidney and CV protection.  Advised patient to practice proper  hygiene to reduce risk of UTIs or yeast infections.  Advised patient to maintain adequate fluid intake to remain hydrated while on SGLT2i therapy.  Answered all patient questions and concerns.    Clinical Pharmacist follow-up: 8/1/25 @ 1100 am, Telehealth visit    Continue all meds under the continuation of care with the referring provider and clinical pharmacy team.    Thank you,  Chichi Moreno, PharmD  Clinical Pharmacist  [phone]    Verbal consent to manage patient's drug therapy was obtained from the patient. They were informed they may decline to participate or withdraw from participation in pharmacy services at any time.

## 2025-08-01 ENCOUNTER — APPOINTMENT (OUTPATIENT)
Dept: PHARMACY | Facility: HOSPITAL | Age: 65
End: 2025-08-01
Payer: COMMERCIAL

## 2025-08-01 ENCOUNTER — TELEPHONE (OUTPATIENT)
Dept: PRIMARY CARE | Facility: CLINIC | Age: 65
End: 2025-08-01

## 2025-08-01 DIAGNOSIS — R11.2 NAUSEA AND VOMITING, UNSPECIFIED VOMITING TYPE: Primary | ICD-10-CM

## 2025-08-01 RX ORDER — ONDANSETRON 8 MG/1
8 TABLET, ORALLY DISINTEGRATING ORAL EVERY 8 HOURS PRN
Qty: 20 TABLET | Refills: 0 | Status: SHIPPED | OUTPATIENT
Start: 2025-08-01 | End: 2025-08-08

## 2025-08-01 NOTE — TELEPHONE ENCOUNTER
Patient called stated she is taking Mounjaro, has been vomiting, very nauseous, and having diarrhea.     Last night she ate a small steak. Today she is having nausea, can barely walk from the nausea. Asking for nausea med    Pharm [facility]

## 2025-08-05 ENCOUNTER — APPOINTMENT (OUTPATIENT)
Dept: PHARMACY | Facility: HOSPITAL | Age: 65
End: 2025-08-05
Payer: COMMERCIAL

## 2025-08-05 DIAGNOSIS — E11.8 DIABETES MELLITUS WITH COMPLICATION (MULTI): Primary | ICD-10-CM

## 2025-08-05 NOTE — PROGRESS NOTES
Clinical Pharmacy Appointment    Patient ID: Mindy Dominguez is a 65 y.o. female who presents for diabetes.     This is a Follow Up appointment.     Referring Provider: Tammie Aldridge DO  PCP: Tammie Aldridge DO   Last visit with PCP: 5/22/25  Next visit with PCP: 10/1/25    Subjective     DIABETES MELLITUS TYPE II:    Known diabetic complications: neuropathy.  Does patient follow with Endocrinology: No  Last optometry exam: 10/11/24  Most recent visit in Podiatry: unknown-- patient denies sores or cuts on feet today       Current diabetic medications include:  Metformin XR 1000 mg once daily at bedtime    Jardiance 25 mg daily   Mounjaro 2.5 mg once weekly on Tuesdays  Lantus 55 units at bedtime    Clarifications to regimen: restarted Mounjaro  Adverse Effects: Nausea, PCP prescribed ondansetron as needed which helps.    Past diabetic medications  Glimepiride 4 mg twice daily (stopped due to low BG readings when starting Mounjaro)    Glucose Readings:  Glucometer/CGM Type: Freestyle Aida 3 plus with phone application  Sent invitation for Libreview - not yet accepted     Restarted sensor about 1 week ago.  150-300 mg/dL most of the time.    Current visit 7 day:   TAR: 13% + 1%  TIR: 79%  TBR: 7%    Past visit 7 day:   TAR: 29% + 37%  TIR: 34%  TBR: 0%    Any episodes of hypoglycemia? No, none.  Did patient treat episode of hypoglycemia appropriately? N/A  Glucagon: No    Lifestyle:  Diet: trying to incorporate meal for breakfast. Trying to drink less sugar and eat fewer sugary snacks. Drinks diet pop. Purchased water packets with zero calories. Trying to incorporate more salads and vegetables. Eating more candy around [place]. Spaghetti with chickpea pasta. Ate 18 popsicles in 2 hours yesterday. Eating a lot of sugar recently. Diet popsicles. Nausea with steak and hamburger.  Physical activity: Limited    Primary/Secondary Prevention:   - Statin? Yes - Atorvastatin 80 mg daily   - ACE-I/ARB? No  -  Aspirin? No     Pertinent PMH Review:  - PMH of Pancreatitis: No  - PMH of Retinopathy: No  - PMH of Urinary Tract Infections: Yes (remote history per patient)  - PMH of MTC: No  - PMH of ASCVD: Yes  - PMH of CKD: No  - PMH of HF: No   - PMH of Obesity: Yes     Medication Reconciliation:  No changes     Drug Interactions  No relevant drug interactions were noted.     Medication System Management  Patient's preferred pharmacy: [facility]  Adherence/Organization: Does not wake up early enough to take morning medications. Has struggled with adherence with twice daily medications.  Affordability/Accessibility: No issues     Objective   Allergies   Allergen Reactions    Tetracycline Unknown     tongue swells    Lisinopril Cough    Sulfa (Sulfonamide Antibiotics) Hives and Rash     Social History     Social History Narrative    Not on file      Medication Review  Current Outpatient Medications   Medication Instructions    amLODIPine (NORVASC) 2.5 mg, oral, Daily    atorvastatin (LIPITOR) 80 mg, oral, Daily    blood-glucose sensor (FreeStyle Aida 3 Plus Sensor) device USE AS DIRECTED TO CHECK YOUR BLOOD AT LEAST 5 TIMES DAILY. CHANGE SENSOR EVERY 14 DAYS.    buPROPion (WELLBUTRIN) 100 mg, oral, Nightly    buPROPion XL (WELLBUTRIN XL) 150 mg, oral, Every morning, Do not crush, chew, or split.    cholecalciferol (Vitamin D-3) 50 mcg (2,000 units) capsule     clonazePAM (KLONOPIN) 0.5 mg, oral, Nightly PRN    DULoxetine (CYMBALTA) 60 mg, oral, Daily, Swallow whole. Do not crush or chew.    empagliflozin (JARDIANCE) 25 mg, oral, Daily    FreeStyle Aida 3 [place] misc Use as instructed to check blood glucose at least 5 times daily.    FreeStyle Lite Strips strip 4 times daily    gabapentin enacarbil (Horizant) 600 mg tablet extended release ER tablet TAKE 1 TABLET BY MOUTH EVERY DAY WITH FOOD AT ABOUT 5PM    gemfibrozil (LOPID) 600 mg, oral, 2 times daily    ibuprofen 600 mg, As needed    Lantus Solostar U-100 Insulin 60  "Units, subcutaneous, Nightly    levothyroxine (Synthroid, Levoxyl) 150 mcg tablet TAKE 1 TABLEY BY MOUTH ONCE DAILY, EXCEPT SATURDAYS & SUNDAYS TAKE 1/2 TAB    metFORMIN (Glucophage) 1,000 mg tablet TAKE 1 TABLET BY MOUTH TWICE A DAY WITH MEALS    Mounjaro 2.5 mg, subcutaneous, Weekly    ondansetron ODT (ZOFRAN-ODT) 8 mg, oral, Every 8 hours PRN    pen needle, diabetic (BD [place] 2nd Gen Pen Needle) 32 gauge x 5/32\" needle USE AS INSTRUCTED TO INJECT INSULIN ONCE DAILY    rOPINIRole (REQUIP) 2 mg, oral, Nightly    sod picosulf-mag ox-citric ac (Clenpiq) 10 mg-3.5 gram- 12 gram/175 mL solution Take one bottle twice as directed by the prep instructions    SUMAtriptan (Imitrex) 50 mg tablet Take by mouth. TAKE 1 TABLET FOR MIGRAINE RELIEF. MAY REPEAT EVERY 2 HOURS. DO NOT TAKE MORE THAN 3 TABLETS IN 24 HOUR PERIOD    traMADol (ULTRAM) 50 mg, oral, Every 6 hours PRN      Vitals  BP Readings from Last 2 Encounters:   06/12/25 124/79   03/14/25 123/84     BMI Readings from Last 1 Encounters:   06/12/25 32.45 kg/m²      Labs  A1C  Lab Results   Component Value Date    HGBA1C 11.2 (H) 03/14/2025    HGBA1C 12.4 (H) 09/10/2024    HGBA1C 11.6 (A) 04/02/2024     BMP  Lab Results   Component Value Date    CALCIUM 10.0 03/14/2025     03/14/2025    K 4.4 03/14/2025    CO2 21 03/14/2025     03/14/2025    BUN 22 03/14/2025    CREATININE 0.82 03/14/2025    EGFR 80 03/14/2025     LFTs  Lab Results   Component Value Date    ALT 24 03/14/2025    AST 14 03/14/2025    ALKPHOS 116 03/14/2025    BILITOT 0.4 03/14/2025     FLP  Lab Results   Component Value Date    TRIG 200 (H) 04/27/2023    CHOL 230 (H) 04/27/2023    LDLF 150 (H) 04/27/2023    HDL 39.8 (A) 04/27/2023     Urine Microalbumin  Lab Results   Component Value Date    MICROALBCREA 30 - 300 (A) 04/02/2024     Weight Management  Wt Readings from Last 3 Encounters:   06/12/25 88.5 kg (195 lb)   05/22/25 88 kg (194 lb)   05/13/25 88.5 kg (195 lb)      There is no height or " weight on file to calculate BMI.     Assessment/Plan   Problem List Items Addressed This Visit       Diabetes mellitus with complication (Multi) - Primary    Relevant Orders    Referral to Clinical Pharmacy     Patient's goal A1c is < 7.0%. Is pt at goal? No, most recent A1c on 9/10/24 of 12.4%. Prior A1c was 11.6% on 4/2/24. Next A1c due after 12/10/24 - lab ordered.     Rationale for plan: Patient checks BG continuously with Freestyle Aida 3 plus sensors and application. Over the past week, patient's SMBGs are significantly improved since restarted Mounjaro. TIR of 79% over the past 7 days, when compared to 34% at previous visit. Patient denies any low BG events. Current DM regimen includes Metformin XR 1000 mg once daily at bedtime, Jardiance 25 mg daily at bedtime, and Lantus 55 units at bedtime, and Mounjaro 2.5 mg once weekly. Patient restarted Mounjaro at last visit and notes increase in nausea. PCP prescribed ondansetron which patient reports helped significantly. Patient also notes that she is learning which foods she can tolerate and avoiding foods that worsen symptoms (fried foods and fatty foods. Also working to improve diet and select lower carbohydrate foods. Encouraged medication adherence and lifestyle changes. Plan to follow up in 1 week to block out time for patient to do Mounjaro injection again. Will also review BG readings and determine need for medication adjustments.     Medication Changes:  CONTINUE:  Metformin XR 1000 mg once daily at bedtime  Jardiance 25 mg daily at bedtime   Freestyle Aida 3 plus sensors with phone application  Mounjaro 2.5 mg once weekly on Tuesdays  DECREASE:   Lantus 50 units at bedtime    Future Considerations:   Mounjaro titration for BG and weight loss goals.      Monitoring and Education:  Mounjaro Education:   Counseled patient on Mounjaro MOA, expectations, side effects, duration of therapy, administration, and monitoring parameters.  Provided detailed dosing  and administration counseling to ensure proper technique.   Reviewed Mounjaro titration schedule, starting with 2.5 mg once weekly to a goal of 15 mg once weekly if tolerated  Counseled patient on the benefits of GLP-1ra glycemic control and weight loss  Reviewed storage requirements of Mounjaro when not in use, and when to administer the medication if a dose is missed.  Advised patient that they may experience improved satiety after meals and portion sizes of meals may be reduced as doses of Mounjaro increase.  Jardiance Education:   Counseled patient on Jardiance MOA, expectations, side effects, duration of therapy, administration, and monitoring parameters.  Reviewed the benefits of SGLT-2i therapy, such as glycemic control and kidney and CV protection.  Advised patient to practice proper  hygiene to reduce risk of UTIs or yeast infections.  Advised patient to maintain adequate fluid intake to remain hydrated while on SGLT2i therapy.  Answered all patient questions and concerns.    Clinical Pharmacist follow-up: 8/12/25 @ 0920 am, Telehealth visit    Continue all meds under the continuation of care with the referring provider and clinical pharmacy team.    Thank you,  Chichi Moreno, PharmD  Clinical Pharmacist  [phone]    Verbal consent to manage patient's drug therapy was obtained from the patient. They were informed they may decline to participate or withdraw from participation in pharmacy services at any time.

## 2025-08-06 DIAGNOSIS — F41.9 ANXIETY DISORDER, UNSPECIFIED: ICD-10-CM

## 2025-08-06 RX ORDER — CLONAZEPAM 0.5 MG/1
0.5 TABLET ORAL NIGHTLY PRN
Qty: 30 TABLET | Refills: 0 | Status: SHIPPED | OUTPATIENT
Start: 2025-08-06

## 2025-08-12 ENCOUNTER — APPOINTMENT (OUTPATIENT)
Dept: PHARMACY | Facility: HOSPITAL | Age: 65
End: 2025-08-12
Payer: COMMERCIAL

## 2025-08-12 DIAGNOSIS — E11.8 DIABETES MELLITUS WITH COMPLICATION (MULTI): Primary | ICD-10-CM

## 2025-08-12 RX ORDER — METFORMIN HYDROCHLORIDE 500 MG/1
500 TABLET, EXTENDED RELEASE ORAL EVERY EVENING
Qty: 30 TABLET | Refills: 11 | Status: SHIPPED | OUTPATIENT
Start: 2025-08-12 | End: 2026-08-12

## 2025-08-25 ENCOUNTER — APPOINTMENT (OUTPATIENT)
Dept: PHARMACY | Facility: HOSPITAL | Age: 65
End: 2025-08-25
Payer: COMMERCIAL

## 2025-08-26 ENCOUNTER — APPOINTMENT (OUTPATIENT)
Dept: PHARMACY | Facility: HOSPITAL | Age: 65
End: 2025-08-26
Payer: COMMERCIAL

## 2025-09-04 DIAGNOSIS — E11.8 DIABETES MELLITUS WITH COMPLICATION (MULTI): ICD-10-CM

## 2025-09-04 RX ORDER — INSULIN GLARGINE 100 [IU]/ML
50 INJECTION, SOLUTION SUBCUTANEOUS NIGHTLY
Qty: 15 ML | Refills: 11 | Status: SHIPPED | OUTPATIENT
Start: 2025-09-04

## 2025-09-18 ENCOUNTER — APPOINTMENT (OUTPATIENT)
Dept: PHARMACY | Facility: HOSPITAL | Age: 65
End: 2025-09-18
Payer: COMMERCIAL

## 2025-10-01 ENCOUNTER — APPOINTMENT (OUTPATIENT)
Dept: PRIMARY CARE | Facility: CLINIC | Age: 65
End: 2025-10-01
Payer: COMMERCIAL